# Patient Record
Sex: MALE | Race: BLACK OR AFRICAN AMERICAN | Employment: FULL TIME | URBAN - METROPOLITAN AREA
[De-identification: names, ages, dates, MRNs, and addresses within clinical notes are randomized per-mention and may not be internally consistent; named-entity substitution may affect disease eponyms.]

---

## 2017-04-14 ENCOUNTER — OFFICE VISIT (OUTPATIENT)
Dept: INTERNAL MEDICINE CLINIC | Age: 77
End: 2017-04-14

## 2017-04-14 VITALS
SYSTOLIC BLOOD PRESSURE: 130 MMHG | TEMPERATURE: 96.8 F | HEART RATE: 73 BPM | HEIGHT: 66 IN | WEIGHT: 170 LBS | OXYGEN SATURATION: 98 % | DIASTOLIC BLOOD PRESSURE: 76 MMHG | RESPIRATION RATE: 17 BRPM | BODY MASS INDEX: 27.32 KG/M2

## 2017-04-14 DIAGNOSIS — L84 CORN OF FOOT: Primary | ICD-10-CM

## 2017-04-14 NOTE — MR AVS SNAPSHOT
Visit Information Date & Time Provider Department Dept. Phone Encounter #  
 4/14/2017 11:15 AM Elian Ortega MD [facility] Internal Medicine [phone] [id number] Follow-up Instructions Return if symptoms worsen or fail to improve. Upcoming Health Maintenance Date Due  
 FOOT EXAM Q1 1/15/1950 MICROALBUMIN Q1 1/15/1950 EYE EXAM RETINAL OR DILATED Q1 1/15/1952 ZOSTER VACCINE AGE 60> 1/15/2002 GLAUCOMA SCREENING Q2Y 1/15/2007 MEDICARE YEARLY EXAM 1/15/2007 INFLUENZA AGE 9 TO ADULT 8/1/2016 HEMOGLOBIN A1C Q6M 2/18/2017 LIPID PANEL Q1 8/18/2017 DTaP/Tdap/Td series (2 - Td) 8/3/2025 COLONOSCOPY 9/20/2026 Allergies as of 4/14/2017  Review Complete On: 4/14/2017 By: Alex Anaya LPN No Known Allergies Current Immunizations  Reviewed on 8/18/2016 Name Date Influenza Vaccine Split 10/13/2010 Pneumococcal Conjugate (PCV-13) 8/18/2016 Pneumococcal Polysaccharide (PPSV-23) 8/3/2015 Tdap 8/3/2015 Not reviewed this visit You Were Diagnosed With   
  
 Codes Comments Corn of foot    -  Primary ICD-10-CM: L84 
ICD-9-CM: 428 Vitals BP Pulse Temp Resp Height(growth percentile) Weight(growth percentile) 130/76 (BP 1 Location: Left arm, BP Patient Position: Sitting) 73 96.8 °F (36 °C) (Oral) 17 5' 6\" (1.676 m) 170 lb (77.1 kg) SpO2 BMI Smoking Status 98% 27.44 kg/m2 Former Smoker Vitals History BMI and BSA Data Body Mass Index Body Surface Area  
 27.44 kg/m 2 1.89 m 2 Preferred Pharmacy Pharmacy Name Phone [address] [place] [address], [facility] Stella Darnell [phone] Your Updated Medication List  
  
   
This list is accurate as of: 4/14/17 11:34 AM.  Always use your most recent med list.  
  
  
  
  
 simvastatin 40 mg tablet Commonly known as:  ZOCOR Take 1 Tab by mouth nightly. Follow-up Instructions Return if symptoms worsen or fail to improve. Patient Instructions Corns and Calluses: Care Instructions Your Care Instructions Corns and calluses are areas of thick, hard, dead skin. They form to protect your skin from injury. Corns usually form where toes rub together. Calluses often form on the hands or feet. They may form wherever the skin rubs against something, such as shoes. In most cases, you can take steps at home to care for a corn or callus. Follow-up care is a key part of your treatment and safety. Be sure to make and go to all appointments, and call your doctor if you are having problems. It's also a good idea to know your test results and keep a list of the medicines you take. How can you care for yourself at home? · Wear shoes and other footwear that fit correctly and are roomy. This will reduce rubbing and give corns or calluses time to heal. 
· Use protective pads, such as moleskin, to cushion the callus or corn. · Soften the corn or callus and remove the dead skin by using over-the-counter products such as salicylic acid. If you have a condition that causes problems with blood flow (such as coronary artery disease) or loss of feeling in your feet (such as diabetes), talk to your doctor before you try any home treatment. · Soak your corn or callus in warm water, and then use a pumice stone to rub dead skin away. · Wash your feet regularly, and rub lotion into your feet while they are still moist. Dry skin can cause a callus to crack and bleed. · Never cut the corn or callus yourself, especially if you have problems with blood flow to your legs or feet. When should you call for help? Call your doctor now or seek immediate medical care if: 
· You have signs of infection, such as: 
¨ Increased pain, swelling, warmth, or redness around the corn or callus. ¨ Red streaks leading from the corn or callus. ¨ Pus draining from the corn or callus. ¨ A fever. Watch closely for changes in your health, and be sure to contact your doctor if: 
· You have a blood flow problem, such as diabetes, and your corn or callus is bothering you. · You do not get better as expected. Where can you learn more? Go to http://cande-damir.info/. Enter R244 in the search box to learn more about \"Corns and Calluses: Care Instructions. \" Current as of: October 13, 2016 Content Version: 11.2 © 2855-0663 [employer]Cloud. Care instructions adapted under license by [employer] (which disclaims liability or warranty for this information). If you have questions about a medical condition or this instruction, always ask your healthcare professional. [address] any warranty or liability for your use of this information. Introducing [facility] & HEALTH SERVICES! Benton Sheets introduces Pure[employer]X patient portal. Now you can access parts of your medical record, email your doctor's office, and request medication refills online. 1. In your internet browser, go to [email or web address] 2. Click on the First Time User? Click Here link in the Sign In box. You will see the New Member Sign Up page. 3. Enter your PureWRX Access Code exactly as it appears below. You will not need to use this code after youve completed the sign-up process. If you do not sign up before the expiration date, you must request a new code. · PureWRX Access Code: [email or web address] Expires: 7/13/2017 11:34 AM 
 
4. Enter the last four digits of your Social Security Number (xxxx) and Date of Birth (mm/dd/yyyy) as indicated and click Submit. You will be taken to the next sign-up page. 5. Create a PureWRX ID. This will be your PureWRX login ID and cannot be changed, so think of one that is secure and easy to remember. 6. Create a Acunotet password. You can change your password at any time. 7. Enter your Password Reset Question and Answer. This can be used at a later time if you forget your password. 8. Enter your e-mail address. You will receive e-mail notification when new information is available in [address] 9. Click Sign Up. You can now view and download portions of your medical record. 10. Click the Download Summary menu link to download a portable copy of your medical information. If you have questions, please visit the Frequently Asked Questions section of the [employer] website. Remember, [employer] is NOT to be used for urgent needs. For medical emergencies, dial 911. Now available from your iPhone and Android! Please provide this summary of care documentation to your next provider. Your primary care clinician is listed as Elian Washington. If you have any questions after today's visit, please call [phone].

## 2017-04-14 NOTE — PATIENT INSTRUCTIONS
Corns and Calluses: Care Instructions  Your Care Instructions  Corns and calluses are areas of thick, hard, dead skin. They form to protect your skin from injury. Corns usually form where toes rub together. Calluses often form on the hands or feet. They may form wherever the skin rubs against something, such as shoes. In most cases, you can take steps at home to care for a corn or callus. Follow-up care is a key part of your treatment and safety. Be sure to make and go to all appointments, and call your doctor if you are having problems. It's also a good idea to know your test results and keep a list of the medicines you take. How can you care for yourself at home? · Wear shoes and other footwear that fit correctly and are roomy. This will reduce rubbing and give corns or calluses time to heal.  · Use protective pads, such as moleskin, to cushion the callus or corn. · Soften the corn or callus and remove the dead skin by using over-the-counter products such as salicylic acid. If you have a condition that causes problems with blood flow (such as coronary artery disease) or loss of feeling in your feet (such as diabetes), talk to your doctor before you try any home treatment. · Soak your corn or callus in warm water, and then use a pumice stone to rub dead skin away. · Wash your feet regularly, and rub lotion into your feet while they are still moist. Dry skin can cause a callus to crack and bleed. · Never cut the corn or callus yourself, especially if you have problems with blood flow to your legs or feet. When should you call for help? Call your doctor now or seek immediate medical care if:  · You have signs of infection, such as:  ¨ Increased pain, swelling, warmth, or redness around the corn or callus. ¨ Red streaks leading from the corn or callus. ¨ Pus draining from the corn or callus. ¨ A fever.   Watch closely for changes in your health, and be sure to contact your doctor if:  · You have a blood flow problem, such as diabetes, and your corn or callus is bothering you. · You do not get better as expected. Where can you learn more? Go to http://cande-damir.info/. Enter R244 in the search box to learn more about \"Corns and Calluses: Care Instructions. \"  Current as of: October 13, 2016  Content Version: 11.2  © 4438-9164 [employer]. Care instructions adapted under license by [employer] (which disclaims liability or warranty for this information). If you have questions about a medical condition or this instruction, always ask your healthcare professional. [place] [address] any warranty or liability for your use of this information.

## 2017-05-01 ENCOUNTER — TELEPHONE (OUTPATIENT)
Dept: INTERNAL MEDICINE CLINIC | Age: 77
End: 2017-05-01

## 2017-05-01 NOTE — TELEPHONE ENCOUNTER
Pt was seen April 14th for pain in feet, insurance is not covering the visit, she thinks it is because we coded the appointment wrong because he was called to come in to be seen, I tried to explain to her that that is an automated call, it was probably to remind him to come in for a follow up on cholesterol but he was seen for feet and nothing was mentioned about cholesterol. Pt's wife would like Dr. Evelin Sanchez to call her back because she does not understand why she was called and he was seen for one thing and called for another.

## 2017-05-02 NOTE — TELEPHONE ENCOUNTER
I called pt's wife back, I informed her there is no way we could re-code or changed the appointment information due to he was ONLY seen for foot pain, she stated that insurance told her if he was seen for something else and had labs done for cholesterol it could be changed. I informed her I understood what insurance was saying but he was not seen for anything else but the foot pain. She stated she came back to get labs drawn for her  but we were closed. I remember her coming back, we were closed due to it being a Friday afternoon so we could not do labs considering there was no provider in the office. She is stating we called her to schedule appointment and we should have done right by them and thought to draw labs for his cholesterol. I informed her that we would not have done that due to the fact that he only came in to be seen for foot pain and not a follow up. She does not want to listen to what I am saying and wants to only speak to a manager about this bill. I also informed her again that the call is an automated call and it is to remind patients to schedule follows up, it does not mean they have to come in, it is just a friendly reminder, but when they scheduled appointment he was seen for foot pain and foot pain only.

## 2017-07-06 RX ORDER — SIMVASTATIN 40 MG/1
TABLET, FILM COATED ORAL
Qty: 90 TAB | Refills: 1 | Status: SHIPPED | OUTPATIENT
Start: 2017-07-06 | End: 2018-05-09 | Stop reason: SDUPTHER

## 2017-10-11 ENCOUNTER — OFFICE VISIT (OUTPATIENT)
Dept: INTERNAL MEDICINE CLINIC | Age: 77
End: 2017-10-11

## 2017-10-11 VITALS
WEIGHT: 178.6 LBS | RESPIRATION RATE: 17 BRPM | DIASTOLIC BLOOD PRESSURE: 77 MMHG | SYSTOLIC BLOOD PRESSURE: 137 MMHG | HEART RATE: 73 BPM | BODY MASS INDEX: 28.7 KG/M2 | TEMPERATURE: 97.7 F | OXYGEN SATURATION: 98 % | HEIGHT: 66 IN

## 2017-10-11 DIAGNOSIS — Z00.00 WELL ADULT ON ROUTINE HEALTH CHECK: Primary | ICD-10-CM

## 2017-10-11 DIAGNOSIS — Z23 ENCOUNTER FOR IMMUNIZATION: ICD-10-CM

## 2017-10-11 DIAGNOSIS — S05.92XA LEFT EYE INJURY, INITIAL ENCOUNTER: ICD-10-CM

## 2017-10-11 DIAGNOSIS — Z78.9 FULL CODE STATUS: ICD-10-CM

## 2017-10-11 RX ORDER — ERYTHROMYCIN 5 MG/G
OINTMENT OPHTHALMIC
Qty: 1 G | Refills: 0 | Status: SHIPPED | OUTPATIENT
Start: 2017-10-11 | End: 2022-06-27

## 2017-10-11 NOTE — MR AVS SNAPSHOT
Visit Information Date & Time Provider Department Dept. Phone Encounter #  
 10/11/2017  2:30 PM Kay Keller MD [facility] Internal Medicine [phone] [id number] Follow-up Instructions Return in about 1 year (around 10/11/2018), or if symptoms worsen or fail to improve. Upcoming Health Maintenance Date Due  
 FOOT EXAM Q1 1/15/1950 MICROALBUMIN Q1 1/15/1950 EYE EXAM RETINAL OR DILATED Q1 1/15/1952 ZOSTER VACCINE AGE 60> 11/15/2001 GLAUCOMA SCREENING Q2Y 1/15/2007 MEDICARE YEARLY EXAM 1/15/2007 HEMOGLOBIN A1C Q6M 2/18/2017 INFLUENZA AGE 9 TO ADULT 8/1/2017 LIPID PANEL Q1 8/18/2017 DTaP/Tdap/Td series (2 - Td) 8/3/2025 COLONOSCOPY 9/20/2026 Allergies as of 10/11/2017  Review Complete On: 4/14/2017 By: Wili Garcia LPN No Known Allergies Current Immunizations  Reviewed on 10/11/2017 Name Date Influenza High Dose Vaccine PF  Incomplete Influenza Vaccine Split 10/13/2010 Pneumococcal Conjugate (PCV-13) 8/18/2016 Pneumococcal Polysaccharide (PPSV-23) 8/3/2015 Tdap 8/3/2015 Reviewed by Kay Keller MD on 10/11/2017 at  2:50 PM  
You Were Diagnosed With   
  
 Codes Comments Well adult on routine health check    -  Primary ICD-10-CM: Z00.00 ICD-9-CM: V70.0 Encounter for immunization     ICD-10-CM: P93 ICD-9-CM: V03.89 Left eye injury, initial encounter     ICD-10-CM: S05. 92XA ICD-9-CM: 921.9 Full code status     ICD-10-CM: Z78.9 ICD-9-CM: V49.89 Vitals BP Pulse Temp Resp Height(growth percentile) Weight(growth percentile)  
 137/77 (BP 1 Location: Left arm, BP Patient Position: Sitting) 73 97.7 °F (36.5 °C) (Oral) 17 5' 6\" (1.676 m) 178 lb 9.6 oz (81 kg) SpO2 BMI Smoking Status 98% 28.83 kg/m2 Former Smoker Vitals History BMI and BSA Data Body Mass Index Body Surface Area  
 28.83 kg/m 2 1.94 m 2 Preferred Pharmacy Pharmacy Name Phone [id number] [phone] Your Updated Medication List  
  
   
This list is accurate as of: 10/11/17  3:09 PM.  Always use your most recent med list.  
  
  
  
  
 erythromycin ophthalmic ointment Commonly known as:  ILOTYCIN  
I inch ribbon in left eye 4-6 times/day. simvastatin 40 mg tablet Commonly known as:  ZOCOR  
TAKE 1 TABLET NIGHTLY Prescriptions Sent to Pharmacy Refills  
 erythromycin (ILOTYCIN) ophthalmic ointment 0 Sig: I inch ribbon in left eye 4-6 times/day. Class: Normal  
 Pharmacy: [facility]/pharmacy #[id number] Ph #: [phone] We Performed the Following CBC WITH AUTOMATED DIFF [43496 CPT(R)] FULL CODE [COD2 Custom] HEMOGLOBIN A1C WITH EAG [05599 CPT(R)] INFLUENZA VIRUS VACCINE, HIGH DOSE SEASONAL, PRESERVATIVE FREE [59969 CPT(R)] LIPID PANEL [18426 CPT(R)] METABOLIC PANEL, COMPREHENSIVE [54547 CPT(R)] PSA W/ REFLX FREE PSA [67677 CPT(R)] TSH AND FREE T4 [78884 CPT(R)] Follow-up Instructions Return in about 1 year (around 10/11/2018), or if symptoms worsen or fail to improve. Introducing [facility] & HEALTH SERVICES! Mera Wright introduces n1health patient portal. Now you can access parts of your medical record, email your doctor's office, and request medication refills online. 1. In your internet browser, go to [email or web address] [email or web address] 2. Click on the First Time User? Click Here link in the Sign In box. You will see the New Member Sign Up page. 3. Enter your n1health Access Code exactly as it appears below. You will not need to use this code after youve completed the sign-up process. If you do not sign up before the expiration date, you must request a new code. · n1health Access Code: [email or web address] Expires: 1/9/2018  2:39 PM 
 
 4. Enter the last four digits of your Social Security Number (xxxx) and Date of Birth (mm/dd/yyyy) as indicated and click Submit. You will be taken to the next sign-up page. 5. Create a Crumpet Cashmere ID. This will be your [employer] login ID and cannot be changed, so think of one that is secure and easy to remember. 6. Create a [employer] password. You can change your password at any time. 7. Enter your Password Reset Question and Answer. This can be used at a later time if you forget your password. 8. Enter your e-mail address. You will receive e-mail notification when new information is available in [address] 9. Click Sign Up. You can now view and download portions of your medical record. 10. Click the Download Summary menu link to download a portable copy of your medical information. If you have questions, please visit the Frequently Asked Questions section of the [employer] website. Remember, [employer] is NOT to be used for urgent needs. For medical emergencies, dial 911. Now available from your iPhone and Android! Please provide this summary of care documentation to your next provider. Your primary care clinician is listed as Elian Washington. If you have any questions after today's visit, please call [phone].

## 2017-10-11 NOTE — LETTER
10/13/2017 10:06 AM 
 
[facility] [address] [facility] [address] Dear Frederic Mccartney: 
 
Please find your most recent results below. Resulted Orders CBC WITH AUTOMATED DIFF Result Value Ref Range WBC 6.8 3.4 - 10.8 x10E3/uL  
 RBC 4.27 4. 14 - 5.80 x10E6/uL HGB 13.2 12.6 - 17.7 g/dL HCT 38.5 37.5 - 51.0 % MCV 90 79 - 97 fL  
 MCH 30.9 26.6 - 33.0 pg  
 MCHC 34.3 31.5 - 35.7 g/dL  
 RDW 13.9 12.3 - 15.4 % PLATELET 937 256 - 814 x10E3/uL NEUTROPHILS 59 Not Estab. % Lymphocytes 29 Not Estab. % MONOCYTES 10 Not Estab. % EOSINOPHILS 2 Not Estab. % BASOPHILS 0 Not Estab. %  
 ABS. NEUTROPHILS 4.0 1.4 - 7.0 x10E3/uL Abs Lymphocytes 2.0 0.7 - 3.1 x10E3/uL  
 ABS. MONOCYTES 0.7 0.1 - 0.9 x10E3/uL  
 ABS. EOSINOPHILS 0.1 0.0 - 0.4 x10E3/uL  
 ABS. BASOPHILS 0.0 0.0 - 0.2 x10E3/uL IMMATURE GRANULOCYTES 0 Not Estab. %  
 ABS. IMM. GRANS. 0.0 0.0 - 0.1 x10E3/uL Narrative Performed at:  [address]  [id number] : Veronica Piper MD, Phone:  [phone] METABOLIC PANEL, COMPREHENSIVE Result Value Ref Range Glucose 118 (H) 65 - 99 mg/dL BUN 10 8 - 27 mg/dL Creatinine 1.00 0.76 - 1.27 mg/dL GFR est non-AA 73 >59 mL/min/1.73 GFR est AA 85 >59 mL/min/1.73  
 BUN/Creatinine ratio 10 10 - 24 Sodium 140 134 - 144 mmol/L Potassium 4.1 3.5 - 5.2 mmol/L Chloride 102 96 - 106 mmol/L  
 CO2 21 18 - 29 mmol/L Calcium 9.1 8.6 - 10.2 mg/dL Protein, total 6.4 6.0 - 8.5 g/dL Albumin 4.1 3.5 - 4.8 g/dL GLOBULIN, TOTAL 2.3 1.5 - 4.5 g/dL A-G Ratio 1.8 1.2 - 2.2 Bilirubin, total 0.3 0.0 - 1.2 mg/dL Alk. phosphatase 76 39 - 117 IU/L  
 AST (SGOT) 22 0 - 40 IU/L  
 ALT (SGPT) 16 0 - 44 IU/L Narrative Performed at:  [address]  [id number] : Veronica Piper MD, Phone:  [phone] LIPID PANEL  
 Result Value Ref Range Cholesterol, total 141 100 - 199 mg/dL Triglyceride 117 0 - 149 mg/dL HDL Cholesterol 36 (L) >39 mg/dL VLDL, calculated 23 5 - 40 mg/dL LDL, calculated 82 0 - 99 mg/dL Narrative Performed at:  [address]  [id number] : Lan Armstrong MD, Phone:  [phone] HEMOGLOBIN A1C WITH EAG Result Value Ref Range Hemoglobin A1c 6.1 (H) 4.8 - 5.6 % Comment:  
            Pre-diabetes: 5.7 - 6.4 Diabetes: >6.4 Glycemic control for adults with diabetes: <7.0 Estimated average glucose 128 mg/dL Narrative Performed at:  [address]  [id number] : Lan Armstrong MD, Phone:  [phone] TSH AND FREE T4 Result Value Ref Range TSH 1.870 0.450 - 4.500 uIU/mL T4, Free 0.94 0.82 - 1.77 ng/dL Narrative Performed at:  [address]  [id number] : Lan Armstrong MD, Phone:  [phone] PSA W/ REFLX FREE PSA Result Value Ref Range Prostate Specific Ag <0.1 0.0 - 4.0 ng/mL Comment:  
   Roche ECLIA methodology. According to the American Urological Association, Serum PSA should 
decrease and remain at undetectable levels after radical 
prostatectomy. The AUA defines biochemical recurrence as an initial 
PSA value 0.2 ng/mL or greater followed by a subsequent confirmatory PSA value 0.2 ng/mL or greater. Values obtained with different assay methods or kits cannot be used 
interchangeably. Results cannot be interpreted as absolute evidence 
of the presence or absence of malignant disease. Reflex Criteria Comment Comment:  
   The percent free PSA is performed on a reflex basis only when the 
total PSA is between 4.0 and 10.0 ng/mL. Narrative Performed at:  [address]  [id number] : Latha Ross MD, Phone:  [phone] CVD REPORT Result Value Ref Range INTERPRETATION Note Comment:  
   Supplement report is available. Narrative Performed at:  [address] [place] [address]  [id number] : Jennifer Corado PhD, Phone:  [phone] RECOMMENDATIONS: 
 
Thyroid, CBC, kidney, liver, prostate enzyme level is normal.  
 
Average blood sugar level is better. Keep up the good work! Please call me if you have any questions: [phone] Sincerely, 
 
 
Rahat Gallo MD

## 2017-10-11 NOTE — PROGRESS NOTES
After obtaining consent, and per orders of Dr. Jazlyn Pham, injection of influenza given by Tad Carrera LPN. Patient instructed to remain in clinic for 20 minutes afterwards, and to report any adverse reaction to me immediately. Patient did not have any adverse reactions during this office visit.       Tad Carrera LPN

## 2017-10-12 ENCOUNTER — CLINICAL SUPPORT (OUTPATIENT)
Dept: INTERNAL MEDICINE CLINIC | Age: 77
End: 2017-10-12

## 2017-10-12 DIAGNOSIS — Z00.00 WELL ADULT EXAM: Primary | ICD-10-CM

## 2017-10-12 NOTE — PROGRESS NOTES
Subjective:   Adi Thomson is a 78 y.o. male presenting today with his wife  for his annual checkup. Reports that two weeks ago something fell on his left eye . He rubbed his eye to clear it . He started having pain and redness in that eye, but recently the pain is gone but the redness is still there. He denies any blurriness. ROS:  Feeling well. No dyspnea or chest pain on exertion. No abdominal pain, change in bowel habits, black or bloody stools. No urinary tract or prostatic symptoms. No neurological complaints. Specific concerns today: see HPI. [place] Patient Active Problem List   Diagnosis Code    Sebaceous cyst L72.3    Prostate cancer ([facility] [address] C61    Hyperlipidemia LDL goal <100 E78.5    Prediabetes R73.03     Current Outpatient Prescriptions   Medication Sig Dispense Refill    erythromycin (ILOTYCIN) ophthalmic ointment I inch ribbon in left eye 4-6 times/day. 1 g 0    simvastatin (ZOCOR) 40 mg tablet TAKE 1 TABLET NIGHTLY 90 Tab 1     No Known Allergies  Past Medical History:   Diagnosis Date    Cancer [facility]) 2007    prostate    ED (erectile dysfunction)     Hypercholesterolemia     Sebaceous cyst 2/11/2013     Past Surgical History:   Procedure Laterality Date    COLONOSCOPY N/A 9/20/2016    COLONOSCOPY performed by Jose Otero MD at [address] [facility], DIAGNOSTIC  2006    HX ORTHOPAEDIC  12/2015    removed cyst on back    HX PROSTATECTOMY  2007     No family history on file. Social History   Substance Use Topics    Smoking status: Former Smoker    Smokeless tobacco: Never Used    Alcohol use No        Lab Results  Component Value Date/Time   Cholesterol, total 143 08/18/2016 02:44 PM   HDL Cholesterol 41 08/18/2016 02:44 PM   LDL, calculated 82 08/18/2016 02:44 PM   Triglyceride 98 08/18/2016 02:44 PM     Lab Results  Component Value Date/Time   ALT (SGPT) 23 08/18/2016 02:44 PM   AST (SGOT) 24 08/18/2016 02:44 PM   Alk.  phosphatase 76 08/18/2016 02:44 PM Bilirubin, total 0.5 08/18/2016 02:44 PM   Albumin 4.5 08/18/2016 02:44 PM   Protein, total 6.7 08/18/2016 02:44 PM   PLATELET 501 58/67/8021 02:44 PM       Lab Results  Component Value Date/Time   GFR est non-AA 88 08/18/2016 02:44 PM   GFR est  08/18/2016 02:44 PM   Creatinine 0.81 08/18/2016 02:44 PM   BUN 9 08/18/2016 02:44 PM   Sodium 144 08/18/2016 02:44 PM   Potassium 4.2 08/18/2016 02:44 PM   Chloride 104 08/18/2016 02:44 PM   CO2 20 08/18/2016 02:44 PM   Lab Results  Component Value Date/Time   Prostate Specific Ag <0.1 08/18/2016 02:44 PM   Prostate Specific Ag <0.1 08/05/2015 07:36 AM   Prostate Specific Ag <0.1 01/31/2013 03:36 PM     Lab Results   Component Value Date/Time    Hemoglobin A1c 6.3 08/18/2016 02:44 PM                       Objective:   Visit Vitals    /77 (BP 1 Location: Left arm, BP Patient Position: Sitting)    Pulse 73    Temp 97.7 °F (36.5 °C) (Oral)    Resp 17    Ht 5' 6\" (1.676 m)    Wt 178 lb 9.6 oz (81 kg)    SpO2 98%    BMI 28.83 kg/m2     The patient appears well, alert, oriented x 3, in no distress. Left corner of conjunctiva is erythematous. ROM is normal. Non tender. ENT normal.  Neck supple. No adenopathy or thyromegaly. ALTAGRACIA. Lungs are clear, good air entry, no wheezes, rhonchi or rales. S1 and S2 normal, no murmurs, regular rate and rhythm. Abdomen is soft without tenderness, guarding, mass or organomegaly.  exam: deferred. Extremities show no edema, normal peripheral pulses. Neurological is normal without focal findings. Assessment/Plan:   healthy adult male  follow low fat diet, follow low salt diet, continue present plan, routine labs ordered. ICD-10-CM ICD-9-CM    1. Well adult on routine health check Z00.00 V70.0 CBC WITH AUTOMATED DIFF      METABOLIC PANEL, COMPREHENSIVE      LIPID PANEL      HEMOGLOBIN A1C WITH EAG      TSH AND FREE T4      PSA W/ REFLX FREE PSA   2.  Encounter for immunization Z23 V03.89 INFLUENZA VIRUS VACCINE, HIGH DOSE SEASONAL, PRESERVATIVE FREE   3. Left eye injury, initial encounter S05. 92XA 921.9 erythromycin (ILOTYCIN) ophthalmic ointment   4. Full code status Z78.9 V49.89 FULL CODE     Diagnoses and all orders for this visit:    1. Well adult on routine health check  -     CBC WITH AUTOMATED DIFF  -     METABOLIC PANEL, COMPREHENSIVE  -     LIPID PANEL  -     HEMOGLOBIN A1C WITH EAG  -     TSH AND FREE T4  -     PSA W/ REFLX FREE PSA    2. Left eye injury, initial encounter  -     Start erythromycin (ILOTYCIN) ophthalmic ointment; I inch ribbon in left eye 4-6 times/day. -      Follow up with eye specialist if symptoms not better. 3. Encounter for immunization  -     INFLUENZA VIRUS VACCINE, HIGH DOSE SEASONAL, PRESERVATIVE FREE    4. Full code status  -     FULL CODE      Follow-up Disposition:  Return in about 1 year (around 10/11/2018), or if symptoms worsen or fail to improve. reviewed diet, exercise and weight control.

## 2017-10-12 NOTE — PROGRESS NOTES
Chief Complaint   Patient presents with    Labs Only     Patient here for labs only. Blood draw tolerated well in left antecubital. Informed pt that he will be notified of results. Pt verbalized his understanding.

## 2017-10-13 LAB
ALBUMIN SERPL-MCNC: 4.1 G/DL (ref 3.5–4.8)
ALBUMIN/GLOB SERPL: 1.8 {RATIO} (ref 1.2–2.2)
ALP SERPL-CCNC: 76 IU/L (ref 39–117)
ALT SERPL-CCNC: 16 IU/L (ref 0–44)
AST SERPL-CCNC: 22 IU/L (ref 0–40)
BASOPHILS # BLD AUTO: 0 X10E3/UL (ref 0–0.2)
BASOPHILS NFR BLD AUTO: 0 %
BILIRUB SERPL-MCNC: 0.3 MG/DL (ref 0–1.2)
BUN SERPL-MCNC: 10 MG/DL (ref 8–27)
BUN/CREAT SERPL: 10 (ref 10–24)
CALCIUM SERPL-MCNC: 9.1 MG/DL (ref 8.6–10.2)
CHLORIDE SERPL-SCNC: 102 MMOL/L (ref 96–106)
CHOLEST SERPL-MCNC: 141 MG/DL (ref 100–199)
CO2 SERPL-SCNC: 21 MMOL/L (ref 18–29)
CREAT SERPL-MCNC: 1 MG/DL (ref 0.76–1.27)
EOSINOPHIL # BLD AUTO: 0.1 X10E3/UL (ref 0–0.4)
EOSINOPHIL NFR BLD AUTO: 2 %
ERYTHROCYTE [DISTWIDTH] IN BLOOD BY AUTOMATED COUNT: 13.9 % (ref 12.3–15.4)
EST. AVERAGE GLUCOSE BLD GHB EST-MCNC: 128 MG/DL
GLOBULIN SER CALC-MCNC: 2.3 G/DL (ref 1.5–4.5)
GLUCOSE SERPL-MCNC: 118 MG/DL (ref 65–99)
HBA1C MFR BLD: 6.1 % (ref 4.8–5.6)
HCT VFR BLD AUTO: 38.5 % (ref 37.5–51)
HDLC SERPL-MCNC: 36 MG/DL
HGB BLD-MCNC: 13.2 G/DL (ref 12.6–17.7)
IMM GRANULOCYTES # BLD: 0 X10E3/UL (ref 0–0.1)
IMM GRANULOCYTES NFR BLD: 0 %
INTERPRETATION, 910389: NORMAL
LDLC SERPL CALC-MCNC: 82 MG/DL (ref 0–99)
LYMPHOCYTES # BLD AUTO: 2 X10E3/UL (ref 0.7–3.1)
LYMPHOCYTES NFR BLD AUTO: 29 %
MCH RBC QN AUTO: 30.9 PG (ref 26.6–33)
MCHC RBC AUTO-ENTMCNC: 34.3 G/DL (ref 31.5–35.7)
MCV RBC AUTO: 90 FL (ref 79–97)
MONOCYTES # BLD AUTO: 0.7 X10E3/UL (ref 0.1–0.9)
MONOCYTES NFR BLD AUTO: 10 %
NEUTROPHILS # BLD AUTO: 4 X10E3/UL (ref 1.4–7)
NEUTROPHILS NFR BLD AUTO: 59 %
PLATELET # BLD AUTO: 226 X10E3/UL (ref 150–379)
POTASSIUM SERPL-SCNC: 4.1 MMOL/L (ref 3.5–5.2)
PROT SERPL-MCNC: 6.4 G/DL (ref 6–8.5)
PSA SERPL-MCNC: <0.1 NG/ML (ref 0–4)
RBC # BLD AUTO: 4.27 X10E6/UL (ref 4.14–5.8)
REFLEX CRITERIA: NORMAL
SODIUM SERPL-SCNC: 140 MMOL/L (ref 134–144)
T4 FREE SERPL-MCNC: 0.94 NG/DL (ref 0.82–1.77)
TRIGL SERPL-MCNC: 117 MG/DL (ref 0–149)
TSH SERPL DL<=0.005 MIU/L-ACNC: 1.87 UIU/ML (ref 0.45–4.5)
VLDLC SERPL CALC-MCNC: 23 MG/DL (ref 5–40)
WBC # BLD AUTO: 6.8 X10E3/UL (ref 3.4–10.8)

## 2017-10-13 NOTE — PROGRESS NOTES
Please call patient to let him know that:    Thyroid, CBC, kidney, liver, prostate enzyme level is normal.     Average blood sugar level is better. Keep up the good work!

## 2018-05-09 RX ORDER — SIMVASTATIN 40 MG/1
TABLET, FILM COATED ORAL
Qty: 90 TAB | Refills: 1 | Status: SHIPPED | OUTPATIENT
Start: 2018-05-09 | End: 2022-03-01

## 2020-11-16 ENCOUNTER — OFFICE VISIT (OUTPATIENT)
Dept: PRIMARY CARE CLINIC | Age: 80
End: 2020-11-16
Payer: COMMERCIAL

## 2020-11-16 VITALS
SYSTOLIC BLOOD PRESSURE: 140 MMHG | WEIGHT: 176.6 LBS | TEMPERATURE: 97.8 F | DIASTOLIC BLOOD PRESSURE: 80 MMHG | OXYGEN SATURATION: 99 % | BODY MASS INDEX: 28.38 KG/M2 | RESPIRATION RATE: 18 BRPM | HEART RATE: 76 BPM | HEIGHT: 66 IN

## 2020-11-16 DIAGNOSIS — Z00.00 WELL ADULT ON ROUTINE HEALTH CHECK: ICD-10-CM

## 2020-11-16 DIAGNOSIS — E78.5 HYPERLIPIDEMIA LDL GOAL <100: ICD-10-CM

## 2020-11-16 DIAGNOSIS — C61 PROSTATE CANCER (HCC): ICD-10-CM

## 2020-11-16 DIAGNOSIS — R06.02 SHORTNESS OF BREATH: ICD-10-CM

## 2020-11-16 DIAGNOSIS — R73.03 PREDIABETES: ICD-10-CM

## 2020-11-16 DIAGNOSIS — R03.0 ELEVATED BLOOD PRESSURE READING: ICD-10-CM

## 2020-11-16 DIAGNOSIS — Z00.00 WELL ADULT ON ROUTINE HEALTH CHECK: Primary | ICD-10-CM

## 2020-11-16 PROCEDURE — 99213 OFFICE O/P EST LOW 20 MIN: CPT | Performed by: FAMILY MEDICINE

## 2020-11-16 PROCEDURE — 93000 ELECTROCARDIOGRAM COMPLETE: CPT | Performed by: FAMILY MEDICINE

## 2020-11-16 PROCEDURE — 99397 PER PM REEVAL EST PAT 65+ YR: CPT | Performed by: FAMILY MEDICINE

## 2020-11-16 RX ORDER — DIPHENHYDRAMINE HCL 25 MG
25 TABLET ORAL
COMMUNITY

## 2020-11-16 NOTE — PROGRESS NOTES
Chief Complaint   Patient presents with   Luke Loomis Annual Wellness Visit     3 most recent PHQ Screens 11/16/2020   Little interest or pleasure in doing things Not at all   Feeling down, depressed, irritable, or hopeless Not at all   Total Score PHQ 2 0     Abuse Screening Questionnaire 11/16/2020   Do you ever feel afraid of your partner? N   Are you in a relationship with someone who physically or mentally threatens you? N   Is it safe for you to go home? Y     Visit Vitals  BP (!) 161/71 (BP 1 Location: Left arm, BP Patient Position: Sitting)   Pulse 76   Temp 97.8 °F (36.6 °C) (Oral)   Resp 18   Ht 5' 6\" (1.676 m)   Wt 176 lb 9.6 oz (80.1 kg)   SpO2 99%   BMI 28.50 kg/m²     1. Have you been to the ER, urgent care clinic since your last visit? Hospitalized since your last visit?no    2. Have you seen or consulted any other health care providers outside of the [address] [place] [address] since your last visit? Include any pap smears or colon screening.  no

## 2020-11-16 NOTE — PROGRESS NOTES
Subjective:   Radha Pineda is a 68 y.o. male presenting for his annual checkup. He is here after three years. He works full time. Patient denies any chest pain but sometimes she feels soa. Intermittently. Sometimes happens when he is just sitting. No wheezing, cough, leg swelling, orthopnea. Hx of prostatectomy due to prostate cancer. Stopped taking Zocor for the past two years. ROS:  Refer to HPI. Feeling well. No dyspnea or chest pain on exertion. No abdominal pain, change in bowel habits, black or bloody stools. No urinary tract or prostatic symptoms. No neurological complaints. Specific concerns today: refer to HPI. Patient Active Problem List   Diagnosis Code    Sebaceous cyst L72.3    Prostate cancer ([facility] [address] C61    Hyperlipidemia LDL goal <100 E78.5    Prediabetes R73.03     Current Outpatient Medications   Medication Sig Dispense Refill    diphenhydrAMINE (Allergy) 25 mg tablet Take 25 mg by mouth every six (6) hours as needed.  simvastatin (ZOCOR) 40 mg tablet TAKE 1 TABLET BY MOUTH NIGHTLY 90 Tab 1    erythromycin (ILOTYCIN) ophthalmic ointment I inch ribbon in left eye 4-6 times/day. 1 g 0     No Known Allergies  Past Medical History:   Diagnosis Date    Cancer [facility]) 2007    prostate    ED (erectile dysfunction)     Hypercholesterolemia     Sebaceous cyst 2/11/2013     Past Surgical History:   Procedure Laterality Date    COLONOSCOPY N/A 9/20/2016    COLONOSCOPY performed by Katya Maldonado MD at [facility]. [address], COLON, DIAGNOSTIC  2006    HX ORTHOPAEDIC  12/2015    removed cyst on back    HX PROSTATECTOMY  2007     No family history on file. Social History     Tobacco Use    Smoking status: Former Smoker    Smokeless tobacco: Never Used   Substance Use Topics    Alcohol use: No        Lab Results   Component Value Date/Time    ALT (SGPT) 16 10/12/2017 09:51 AM    Alk.  phosphatase 76 10/12/2017 09:51 AM    Bilirubin, total 0.3 10/12/2017 09:51 AM Albumin 4.1 10/12/2017 09:51 AM    Protein, total 6.4 10/12/2017 09:51 AM    PLATELET 257 19/38/9046 09:51 AM     Lab Results   Component Value Date/Time    GFR est non-AA 73 10/12/2017 09:51 AM    GFR est AA 85 10/12/2017 09:51 AM    Creatinine 1.00 10/12/2017 09:51 AM    BUN 10 10/12/2017 09:51 AM    Sodium 140 10/12/2017 09:51 AM    Potassium 4.1 10/12/2017 09:51 AM    Chloride 102 10/12/2017 09:51 AM    CO2 21 10/12/2017 09:51 AM     Lab Results   Component Value Date/Time    Prostate Specific Ag <0.1 10/12/2017 09:51 AM    Prostate Specific Ag <0.1 08/18/2016 02:44 PM    Prostate Specific Ag <0.1 08/05/2015 07:36 AM     Lab Results   Component Value Date/Time    Hemoglobin A1c 6.1 (H) 10/12/2017 09:51 AM         Objective:     Visit Vitals  BP (!) 140/80 (BP 1 Location: Left arm, BP Patient Position: Sitting)   Pulse 76   Temp 97.8 °F (36.6 °C) (Oral)   Resp 18   Ht 5' 6\" (1.676 m)   Wt 176 lb 9.6 oz (80.1 kg)   SpO2 99%   BMI 28.50 kg/m²     The patient appears well, alert, oriented x 3, in no distress. ENT normal.  Neck supple. No adenopathy or thyromegaly. ALTAGRACIA. Lungs are clear, good air entry, no wheezes, rhonchi or rales. S1 and S2 normal, no murmurs, regular rate and rhythm. Abdomen is soft without tenderness, guarding, mass or organomegaly.  exam: deferred. Extremities show no edema, normal peripheral pulses. Neurological is normal without focal findings. MSK: arthritic change noted in his finger joints. Assessment/Plan:   healthy adult male  lose weight, increase physical activity, follow low fat diet, follow low salt diet, continue present plan, routine labs ordered. ICD-10-CM ICD-9-CM    1. Well adult on routine health check  L17.52 B59.6 METABOLIC PANEL, COMPREHENSIVE      CBC WITH AUTOMATED DIFF      THYROID CASCADE PROFILE   2. Prostate cancer (HCC)  C61 185 PSA W/ REFLX FREE PSA   3. Prediabetes  R73.03 790.29 HEMOGLOBIN A1C WITH EAG   4.  Hyperlipidemia LDL goal <100  E78.5 272.4 LIPID PANEL      METABOLIC PANEL, COMPREHENSIVE   5. Shortness of breath  R06.02 786.05 AMB POC EKG ROUTINE W/ 12 LEADS, INTER & REP     Diagnoses and all orders for this visit:    1. Well adult on routine health check  -     METABOLIC PANEL, COMPREHENSIVE; Future  -     CBC WITH AUTOMATED DIFF; Future  -     THYROID CASCADE PROFILE; Future    2. Shortness of breath  -     AMB POC EKG ROUTINE W/ 12 LEADS, INTER & REP-  Normal.             Mild sob notice intermittently. Advised to follow up with me if symptoms worsen or he continue to have symptoms. 3. Prostate cancer (HCC)  -     PSA W/ REFLX FREE PSA; Future    4. Prediabetes  -     HEMOGLOBIN A1C WITH EAG; Future              Counseled on diet and exercise. 5. Hyperlipidemia LDL goal <100  -     LIPID PANEL; Future  -     METABOLIC PANEL, COMPREHENSIVE; Future        He is not taking zocor for the past two years. 6. Elevated blood pressure: Advised to check BP daily and follow up. F/u sooner of BP stays >140/90s    Follow-up and Dispositions    · Return in about 6 months (around 5/16/2021), or if symptoms worsen or fail to improve, for Bring BP log book. .       current treatment plan is effective, no change in therapy  reviewed diet, exercise and weight control.

## 2020-11-19 LAB
ALBUMIN SERPL-MCNC: 4.2 G/DL (ref 3.7–4.7)
ALBUMIN/GLOB SERPL: 1.6 {RATIO} (ref 1.2–2.2)
ALP SERPL-CCNC: 93 IU/L (ref 39–117)
ALT SERPL-CCNC: 11 IU/L (ref 0–44)
AST SERPL-CCNC: 19 IU/L (ref 0–40)
BASOPHILS # BLD AUTO: 0 X10E3/UL (ref 0–0.2)
BASOPHILS NFR BLD AUTO: 1 %
BILIRUB SERPL-MCNC: 0.3 MG/DL (ref 0–1.2)
BUN SERPL-MCNC: 9 MG/DL (ref 8–27)
BUN/CREAT SERPL: 8 (ref 10–24)
CALCIUM SERPL-MCNC: 9.1 MG/DL (ref 8.6–10.2)
CHLORIDE SERPL-SCNC: 104 MMOL/L (ref 96–106)
CHOLEST SERPL-MCNC: 220 MG/DL (ref 100–199)
CO2 SERPL-SCNC: 24 MMOL/L (ref 20–29)
CREAT SERPL-MCNC: 1.07 MG/DL (ref 0.76–1.27)
EOSINOPHIL # BLD AUTO: 0.1 X10E3/UL (ref 0–0.4)
EOSINOPHIL NFR BLD AUTO: 1 %
ERYTHROCYTE [DISTWIDTH] IN BLOOD BY AUTOMATED COUNT: 13.5 % (ref 11.6–15.4)
EST. AVERAGE GLUCOSE BLD GHB EST-MCNC: 134 MG/DL
GLOBULIN SER CALC-MCNC: 2.6 G/DL (ref 1.5–4.5)
GLUCOSE SERPL-MCNC: 87 MG/DL (ref 65–99)
HBA1C MFR BLD: 6.3 % (ref 4.8–5.6)
HCT VFR BLD AUTO: 41.9 % (ref 37.5–51)
HDLC SERPL-MCNC: 35 MG/DL
HGB BLD-MCNC: 14.2 G/DL (ref 13–17.7)
IMM GRANULOCYTES # BLD AUTO: 0 X10E3/UL (ref 0–0.1)
IMM GRANULOCYTES NFR BLD AUTO: 0 %
LDLC SERPL CALC-MCNC: 165 MG/DL (ref 0–99)
LYMPHOCYTES # BLD AUTO: 2.1 X10E3/UL (ref 0.7–3.1)
LYMPHOCYTES NFR BLD AUTO: 38 %
MCH RBC QN AUTO: 29.8 PG (ref 26.6–33)
MCHC RBC AUTO-ENTMCNC: 33.9 G/DL (ref 31.5–35.7)
MCV RBC AUTO: 88 FL (ref 79–97)
MONOCYTES # BLD AUTO: 0.5 X10E3/UL (ref 0.1–0.9)
MONOCYTES NFR BLD AUTO: 9 %
NEUTROPHILS # BLD AUTO: 2.9 X10E3/UL (ref 1.4–7)
NEUTROPHILS NFR BLD AUTO: 51 %
PLATELET # BLD AUTO: 250 X10E3/UL (ref 150–450)
POTASSIUM SERPL-SCNC: 4.2 MMOL/L (ref 3.5–5.2)
PROT SERPL-MCNC: 6.8 G/DL (ref 6–8.5)
PSA SERPL-MCNC: 0.1 NG/ML (ref 0–4)
RBC # BLD AUTO: 4.77 X10E6/UL (ref 4.14–5.8)
REFLEX CRITERIA: NORMAL
SODIUM SERPL-SCNC: 141 MMOL/L (ref 134–144)
TRIGL SERPL-MCNC: 108 MG/DL (ref 0–149)
TSH SERPL DL<=0.005 MIU/L-ACNC: 1.29 UIU/ML (ref 0.45–4.5)
VLDLC SERPL CALC-MCNC: 20 MG/DL (ref 5–40)
WBC # BLD AUTO: 5.6 X10E3/UL (ref 3.4–10.8)

## 2020-11-19 NOTE — PROGRESS NOTES
Please mail letter:    1. CBC, kidney, liver, thyroid  level is within normal range. 2. Total cholesterol level and the bad cholesterol level is moderately  elevated. Continue watching your diet, eat healthy, less ambriz and fatty food, more baked or grilled or broiled food. Exercise 150 minutes/week will be helpful as well. Will recheck level in 6 months. Please make appointment in 6 months. 3. Prediabetes: Average blood sugar( Hg A1c ) is in the range of prediabetic level. Prediabetes is a warning sign that you are at risk for getting type 2 diabetes. It means that your blood sugar is higher than it should be. Most people who get type 2 diabetes have prediabetes first. The good news is that lifestyle changes may help you get your blood sugar back to normal and avoid or delay diabetes. Will recheck this level in 6 months. 4. PSA level is 0.1, it should be <0.1 with your history of prostatectomy. Need a follow up PSA in 6 months.

## 2021-03-11 ENCOUNTER — OFFICE VISIT (OUTPATIENT)
Dept: PRIMARY CARE CLINIC | Age: 81
End: 2021-03-11
Payer: COMMERCIAL

## 2021-03-11 VITALS
HEIGHT: 66 IN | WEIGHT: 173 LBS | SYSTOLIC BLOOD PRESSURE: 164 MMHG | RESPIRATION RATE: 16 BRPM | DIASTOLIC BLOOD PRESSURE: 86 MMHG | BODY MASS INDEX: 27.8 KG/M2 | TEMPERATURE: 98.1 F | HEART RATE: 94 BPM | OXYGEN SATURATION: 98 %

## 2021-03-11 DIAGNOSIS — R41.3 MEMORY CHANGE: Primary | ICD-10-CM

## 2021-03-11 DIAGNOSIS — R41.89 DISORGANIZED THOUGHT PROCESS: ICD-10-CM

## 2021-03-11 DIAGNOSIS — F80.1 EXPRESSIVE SPEECH DISORDER: ICD-10-CM

## 2021-03-11 DIAGNOSIS — R03.0 ELEVATED BLOOD PRESSURE READING: ICD-10-CM

## 2021-03-11 PROCEDURE — 99214 OFFICE O/P EST MOD 30 MIN: CPT | Performed by: FAMILY MEDICINE

## 2021-03-11 NOTE — PROGRESS NOTES
Suyapa Pastor is a 80 y.o. male     Chief Complaint   Patient presents with    Memory Loss    Referral / Consult     1. Have you been to the ER, urgent care clinic since your last visit? Hospitalized since your last visit? No    2. Have you seen or consulted any other health care providers outside of the [address] [place] [address] since your last visit? Include any pap smears or colon screening.  No     Visit Vitals  Temp 98.1 °F (36.7 °C) (Temporal)   Ht 5' 6\" (1.676 m)   Wt 173 lb (78.5 kg)   BMI 27.92 kg/m²

## 2021-03-12 NOTE — PROGRESS NOTES
Subjective:     Chief Complaint   Patient presents with    Memory Loss    Referral / Consult        He  is a 80y.o. year old male who presents with his wife with a concern about memory and behavioral issues. Wife wanted to know if he is schizophrenic, has  ADHD. I saw him in November 2020 and prior to that I saw him last in 2017. They wrote his issues in a paper format and brought for my review. She reports that they have been  for about 35 years. She has been noticing this type of behavior since she got  she states but its been worse. She or patient never mentioned or showed any concern about this type of behavior . He works full time. Reports that she notice him start conversation in the middle of the sentence or state sentences backwards. He will start sentences with pronoun instead of noun. He talks a lot and do things without even thinking. If asked then he will say he does not even know why he did that. He is forgetful. He will do same  things over and over again. No hx of stroke, gait abnormality. His BP is high in office. He is not on any med. Lab Results   Component Value Date/Time    TSH 1.290 11/18/2020 12:00 AM    TSH 1.870 10/12/2017 09:51 AM    T4, Free 0.94 10/12/2017 09:51 AM            Pertinent items are noted in HPI.   Objective:     Vitals:    03/11/21 1627 03/11/21 1631   BP: (!) 165/81 (!) 164/86   Pulse: 94    Resp: 16    Temp: 98.1 °F (36.7 °C)    TempSrc: Temporal    SpO2: 98%    Weight: 173 lb (78.5 kg)    Height: 5' 6\" (1.676 m)        Physical Examination: General appearance - alert, well appearing, and in no distress, oriented to person, place, and time and overweight  Mental status - alert, oriented to person, place, and time, normal mood, behavior, speech, dress, motor activity, and thought processes  Ears - bilateral TM's and external ear canals normal  Nose - normal and patent, no erythema, discharge or polyps  Neck - supple, no significant adenopathy, thyroid exam: thyroid is normal in size without nodules or tenderness  Chest - clear to auscultation, no wheezes, rales or rhonchi, symmetric air entry  Heart - normal rate, regular rhythm, normal S1, S2, no murmurs, rubs, clicks or gallops  Neurological - alert, oriented, normal speech, no focal findings or movement disorder noted. Mini Mental Score: 28          No Known Allergies   Social History     Socioeconomic History    Marital status:      Spouse name: Not on file    Number of children: Not on file    Years of education: Not on file    Highest education level: Not on file   Tobacco Use    Smoking status: Former Smoker    Smokeless tobacco: Never Used   Substance and Sexual Activity    Alcohol use: No    Drug use: No    Sexual activity: Yes     Partners: Female      History reviewed. No pertinent family history. Past Surgical History:   Procedure Laterality Date    COLONOSCOPY N/A 9/20/2016    COLONOSCOPY performed by Nanette Arias MD at [address] [place] [address], COLON, DIAGNOSTIC  2006    HX ORTHOPAEDIC  12/2015    removed cyst on back    HX PROSTATECTOMY  2007      Past Medical History:   Diagnosis Date    Cancer [facility]) 2007    prostate    ED (erectile dysfunction)     Hypercholesterolemia     Sebaceous cyst 2/11/2013      Current Outpatient Medications   Medication Sig Dispense Refill    diphenhydrAMINE (Allergy) 25 mg tablet Take 25 mg by mouth every six (6) hours as needed.  erythromycin (ILOTYCIN) ophthalmic ointment I inch ribbon in left eye 4-6 times/day. 1 g 0    simvastatin (ZOCOR) 40 mg tablet TAKE 1 TABLET BY MOUTH NIGHTLY 90 Tab 1        Assessment/ Plan:   Diagnoses and all orders for this visit:    1. Memory change  -     REFERRAL TO NEUROLOGY  -     REFERRAL TO NEUROLOGY  -     CT HEAD WO CONT; Future         MMSE is 28. MME is in good range. Will check CT head.        Clinical psychologist evaluation and neurology referral.  2. Expressive speech disorder  -     REFERRAL TO NEUROLOGY  -     REFERRAL TO NEUROLOGY  -     CT HEAD WO CONT; Future    3. Disorganized thought process  -     REFERRAL TO NEUROLOGY  -     REFERRAL TO NEUROLOGY  -     CT HEAD WO CONT; Future    4. Elevated blood pressure reading      BP is elevated . Check BP at home and follow up if BP stays high. Spent > 30 minutes with patient. Medication risks/benefits/costs/interactions/alternatives discussed with patient. Advised patient to call back or return to office if symptoms worsen/change/persist. If patient cannot reach us or should anything more severe/urgent arise he/she should proceed directly to the nearest emergency department. Discussed expected course/resolution/complications of diagnosis in detail with patient. Patient given a written after visit summary which includes her diagnoses, current medications and vitals. Patient expressed understanding with the diagnosis and plan. Follow-up and Dispositions    · Return if symptoms worsen or fail to improve.

## 2021-03-15 ENCOUNTER — TELEPHONE (OUTPATIENT)
Dept: PRIMARY CARE CLINIC | Age: 81
End: 2021-03-15

## 2021-03-15 NOTE — TELEPHONE ENCOUNTER
Pancho called on behalf of patient, Marcellus Babinski. Jadiel. Patient wants to schedule a CT Scan of head and neck. Jeanne Barksdale called because they do not see an authorization for this procedure. Please advise patient as to whether the authorization for this procedure is coming from Dr. Radha Farmer office, or if the referring Neurologist will be the ordering physician.

## 2021-03-15 NOTE — TELEPHONE ENCOUNTER
Order was placed already on  3/11/2021. I believe the  will notify patient and send for authorization to Jessica Parisi.

## 2021-03-17 NOTE — TELEPHONE ENCOUNTER
Patients wife called and stated pt is having ct done at [facility], they told him we have to get the auth for the ct.

## 2021-03-18 NOTE — TELEPHONE ENCOUNTER
We do not do insurance prior auth or approval for imaging. Scheduling department will do that. Please call patient to notify and see what exactly she is asking. Thanks.

## 2021-03-29 ENCOUNTER — HOSPITAL ENCOUNTER (OUTPATIENT)
Dept: CT IMAGING | Age: 81
Discharge: HOME | End: 2021-03-29
Payer: COMMERCIAL

## 2021-03-29 DIAGNOSIS — R41.89 DISORGANIZED THOUGHT PROCESS: ICD-10-CM

## 2021-03-29 DIAGNOSIS — R41.3 MEMORY CHANGE: ICD-10-CM

## 2021-03-29 DIAGNOSIS — F80.1 EXPRESSIVE SPEECH DISORDER: ICD-10-CM

## 2021-03-29 PROCEDURE — 70450 CT HEAD/BRAIN W/O DYE: CPT | Performed by: FAMILY MEDICINE

## 2021-03-30 NOTE — PROGRESS NOTES
Called pts phone, spoke with wife. Gave pt's message about CT scan from Dr. Bobby Viveros and asked them to make appt with Neurologist.  Pt's wife verbalized understanding.

## 2021-06-15 ENCOUNTER — OFFICE VISIT (OUTPATIENT)
Dept: NEUROLOGY | Age: 81
End: 2021-06-15
Payer: COMMERCIAL

## 2021-06-15 VITALS
DIASTOLIC BLOOD PRESSURE: 80 MMHG | HEART RATE: 88 BPM | BODY MASS INDEX: 27.53 KG/M2 | HEIGHT: 67 IN | OXYGEN SATURATION: 98 % | SYSTOLIC BLOOD PRESSURE: 130 MMHG | WEIGHT: 175.4 LBS

## 2021-06-15 DIAGNOSIS — F32.A DEPRESSION, UNSPECIFIED DEPRESSION TYPE: ICD-10-CM

## 2021-06-15 DIAGNOSIS — R41.3 MEMORY LOSS: Primary | ICD-10-CM

## 2021-06-15 PROCEDURE — 99214 OFFICE O/P EST MOD 30 MIN: CPT | Performed by: PSYCHIATRY & NEUROLOGY

## 2021-06-15 RX ORDER — SERTRALINE HYDROCHLORIDE 25 MG/1
25 TABLET, FILM COATED ORAL DAILY
Qty: 90 TABLET | Refills: 1 | Status: SHIPPED | OUTPATIENT
Start: 2021-06-15 | End: 2021-11-02 | Stop reason: SDUPTHER

## 2021-06-15 NOTE — PROGRESS NOTES
Neurology Consult Note      HISTORY PROVIDED BY: patient and spouse    Chief Complaint:   Chief Complaint   Patient presents with    New Patient    Memory Loss     and confusion      Subjective:    Nohemi Baldwin is a 80 y.o. right handed male who presents in consultation for memory loss. He notes that he is having trouble expressing himself, may start in the middle of sentence when talking, and has memory loss. Will forget what his wife said, not getting lost, not having trouble working as a . His wife notes that they have been  29 years and shortly after they got , she began noticing difficulties. Gave an example of talking about someone using only pronouns and not acknowledging who the subject is. He doesn't answer questions in a clear way, gives example of asking him, \"Do you want those cucumbers? \" He responds \"Yes, you can have them. \"  She states that they are here now b/c she is tired of it. He does feel things have gotten worse from a memory standpoint and that he has become easily distracted. She asks about the possibility of ADHD. Notes that he parked in a handicap spot recently and did not realize it. Not completing tasks as directed by his wife. He has trouble controlling anger. No family h/o dementia. Takes benadryl daily for allergies. Sleeps wells, +snores, may wake to go to the restroom 2-3 times a night.      Past Medical History:   Diagnosis Date    ED (erectile dysfunction)     Hypercholesterolemia     Prediabetes     Prostate cancer ([facility] [address] 2007    surgery    Sebaceous cyst 2/11/2013      Past Surgical History:   Procedure Laterality Date    COLONOSCOPY N/A 9/20/2016    COLONOSCOPY performed by Nakul Romero MD at [address] [place] [address] [place], COLON, DIAGNOSTIC  2006    HX ORTHOPAEDIC  12/2015    removed cyst on back    HX PROSTATECTOMY  2007      Social History     Socioeconomic History    Marital status:      Spouse name: Not on file    Number of children: Not on file    Years of education: Not on file    Highest education level: Not on file   Occupational History    Occupation:    Tobacco Use    Smoking status: Former Smoker     Quit date:      Years since quittin.5    Smokeless tobacco: Never Used   Vaping Use    Vaping Use: Never used   Substance and Sexual Activity    Alcohol use: No    Drug use: Not Currently     Comment: Quit 35 years ago    Sexual activity: Yes     Partners: Female   Other Topics Concern    Not on file   Social History Narrative    , lives in [place]. Completed 11th grade, did well, no learning disabilities. Social Determinants of Health     Financial Resource Strain:     Difficulty of Paying Living Expenses:    Food Insecurity:     Worried About Running Out of Food in the Last Year:     [address] in the Last Year:    Transportation Needs:     Lack of Transportation (Medical):  Lack of Transportation (Non-Medical):    Physical Activity:     Days of Exercise per Week:     Minutes of Exercise per Session:    Stress:     Feeling of Stress :    Social Connections:     Frequency of Communication with Friends and Family:     Frequency of Social Gatherings with Friends and Family:     Attends [employer] Services:     Active Member of Clubs or Organizations:     Attends Club or Organization Meetings:     Marital Status:    Intimate Partner Violence:     Fear of Current or Ex-Partner:     Emotionally Abused:     Physically Abused:     Sexually Abused:      Family History   Problem Relation Age of Onset    Breast Cancer Mother         Dec 45yo    Other Father         Head injury, Dec 45yo    Cancer Sister         esophageal    No Known Problems Sister     Arthritis-osteo Daughter          Objective:   Review of Systems   Respiratory:        Snoring     Gastrointestinal: Positive for abdominal pain. Psychiatric/Behavioral: Positive for memory loss.    All other systems reviewed and are negative. No Known Allergies     Meds:  Outpatient Medications Prior to Visit   Medication Sig Dispense Refill    diphenhydrAMINE (Allergy) 25 mg tablet Take 25 mg by mouth every six (6) hours as needed.  simvastatin (ZOCOR) 40 mg tablet TAKE 1 TABLET BY MOUTH NIGHTLY (Patient not taking: Reported on 6/15/2021) 90 Tab 1    erythromycin (ILOTYCIN) ophthalmic ointment I inch ribbon in left eye 4-6 times/day. (Patient not taking: Reported on 6/15/2021) 1 g 0     No facility-administered medications prior to visit. Imaging:  MRI Results (most recent):  No results found for this or any previous visit. CT Results (most recent):  Results from Hospital Encounter encounter on 03/29/21    CT HEAD WO CONT    Narrative  INDICATION: Expressive speech disorder. Disorganized thought process. Memory  change. Exam: Noncontrast CT of the brain is performed with 5 mm collimation. CT dose reduction was achieved with the use of the standardized protocol  tailored for this examination and automatic exposure control for dose  modulation. FINDINGS: There is no acute intracranial hemorrhage, mass, mass effect or  herniation. Ventricular system is normal. The gray-white matter differentiation  is well-preserved. The mastoid air cells are well pneumatized. The visualized  paranasal sinuses are normal.    Impression  No acute intracranial hemorrhage, mass or infarct.        Reviewed records in [facility] tab today    Lab Review   Results for orders placed or performed in visit on 11/16/20   HEMOGLOBIN A1C WITH EAG   Result Value Ref Range    Hemoglobin A1c 6.3 (H) 4.8 - 5.6 %    Estimated average glucose 134 mg/dL   PSA W/ REFLX FREE PSA   Result Value Ref Range    Prostate Specific Ag 0.1 0.0 - 4.0 ng/mL    Reflex Criteria Comment    LIPID PANEL   Result Value Ref Range    Cholesterol, total 220 (H) 100 - 199 mg/dL    Triglyceride 108 0 - 149 mg/dL    HDL Cholesterol 35 (L) >39 mg/dL VLDL, calculated 20 5 - 40 mg/dL    LDL, calculated 165 (H) 0 - 99 mg/dL   METABOLIC PANEL, COMPREHENSIVE   Result Value Ref Range    Glucose 87 65 - 99 mg/dL    BUN 9 8 - 27 mg/dL    Creatinine 1.07 0.76 - 1.27 mg/dL    GFR est non-AA 66 >59 mL/min/1.73    GFR est AA 76 >59 mL/min/1.73    BUN/Creatinine ratio 8 (L) 10 - 24    Sodium 141 134 - 144 mmol/L    Potassium 4.2 3.5 - 5.2 mmol/L    Chloride 104 96 - 106 mmol/L    CO2 24 20 - 29 mmol/L    Calcium 9.1 8.6 - 10.2 mg/dL    Protein, total 6.8 6.0 - 8.5 g/dL    Albumin 4.2 3.7 - 4.7 g/dL    GLOBULIN, TOTAL 2.6 1.5 - 4.5 g/dL    A-G Ratio 1.6 1.2 - 2.2    Bilirubin, total 0.3 0.0 - 1.2 mg/dL    Alk. phosphatase 93 39 - 117 IU/L    AST (SGOT) 19 0 - 40 IU/L    ALT (SGPT) 11 0 - 44 IU/L   CBC WITH AUTOMATED DIFF   Result Value Ref Range    WBC 5.6 3.4 - 10.8 x10E3/uL    RBC 4.77 4.14 - 5.80 x10E6/uL    HGB 14.2 13.0 - 17.7 g/dL    HCT 41.9 37.5 - 51.0 %    MCV 88 79 - 97 fL    MCH 29.8 26.6 - 33.0 pg    MCHC 33.9 31.5 - 35.7 g/dL    RDW 13.5 11.6 - 15.4 %    PLATELET 905 621 - 716 x10E3/uL    NEUTROPHILS 51 Not Estab. %    Lymphocytes 38 Not Estab. %    MONOCYTES 9 Not Estab. %    EOSINOPHILS 1 Not Estab. %    BASOPHILS 1 Not Estab. %    ABS. NEUTROPHILS 2.9 1.4 - 7.0 x10E3/uL    Abs Lymphocytes 2.1 0.7 - 3.1 x10E3/uL    ABS. MONOCYTES 0.5 0.1 - 0.9 x10E3/uL    ABS. EOSINOPHILS 0.1 0.0 - 0.4 x10E3/uL    ABS. BASOPHILS 0.0 0.0 - 0.2 x10E3/uL    IMMATURE GRANULOCYTES 0 Not Estab. %    ABS. IMM. GRANS. 0.0 0.0 - 0.1 x10E3/uL   THYROID CASCADE PROFILE   Result Value Ref Range    TSH 1.290 0.450 - 4.500 uIU/mL        Exam:  Visit Vitals  /80   Pulse 88   Ht 5' 7\" (1.702 m)   Wt 175 lb 6.4 oz (79.6 kg)   SpO2 98%   BMI 27.47 kg/m²     General:  Alert, cooperative, no distress. Head:  Normocephalic, without obvious abnormality, atraumatic.    Respiratory:  Heart:   Non labored breathing  Regular rate and rhythm, no murmurs   Neck:   2+ carotids, no bruits Extremities: Warm, no cyanosis or edema. Pulses: 2+ radial pulses. Neurologic:  MS: Alert, appropriate, speech intact. Language- see MMSE. Attention and fund of knowledge appropriate. Recent and remote memory intact.   Cranial Nerves:  II: visual fields Full to confrontation   II: pupils Equal, round, reactive to light   II: optic disc    III,VII: ptosis none   III,IV,VI: extraocular muscles  EOMI, no nystagmus or diplopia   V: facial light touch sensation  normal   VII: facial muscle function   symmetric   VIII: hearing Dec to normal conversation   IX: soft palate elevation  normal   XI: trapezius strength  5/5   XI: sternocleidomastoid strength 5/5   XII: tongue  Midline     Motor: normal bulk and tone, no tremor              Strength: 5/5 throughout, no PD  Sensory: intact to LT, PP  Coordination: FTN and HTS intact, CHAR[place]TE intact  Gait: normal gait, dec arm swing on right, holds arm flexed  Reflexes: 2+ symmetric, toes downgoing    Mini Mental State Exam 3/14/2021   What is the Year 1   What is the Season 1   What is the Date 1   What is the Day 1   What is the Month 1   Where are we State 1   Where are we Country 1   Where are we [place] or [facility] 1   Where are we Floor 1   Name three objects, then ask the patient to say them 3   Serial sevens Subtract 7 from 100 in increments 5   Ask for the three objects repeated above 3   Name a pencil 1   Name a watch 1   Have the patient repeat this phrase \"No ifs, ands, or buts\" 1   Three stage command: Take the paper in your right hand 1   Fold the paper in half 1   Put the paper on the floor 1   Read and obey the following: CLOSE YOUR EYES 1   Have the patient write a sentence 1   Have the patient copy a figure 0   Mini Mental Score 28          Assessment/Plan   Pt is a 80 y.o. right handed male with at least 34y history of difficulties communicating, starting sentences mid sentence, identifying the subject of his sentence with only pronouns, will not remember what his wife said or not follow directions she has given him. He describes himself as being easily distracted. He has not had any trouble driving or working as a . Exam with MMSE score 28/30 missing place, unable to copy, decreased hearing, holds right arm flexed when walking. Discussed the possibility that he does have underlying ADHD and now with age-related changes he is having more difficulty, hearing loss and educational level may also be playing a part. Discussed his mood and patient acknowledges depression and easily losing his temper, and this could also be contributing to difficulties. I recommend MRI of the brain without contrast to assess for structural etiology of his language/memory difficulties especially given focal finding on exam.  Will start Zoloft 25 mg daily for mood, side effects reviewed and patient encouraged to seek counseling. Briefly discussed his sleep, concerning for sleep apnea, but will hold off on referral for sleep study for now. We will have the patient follow-up in approximately 3 months, instructed to call in the interim with any questions or concerns. ICD-10-CM ICD-9-CM    1. Memory loss  R41.3 780.93 MRI BRAIN WO CONT   2. Depression, unspecified depression type  F32.9 311        Signed:   Todd Coles MD  6/15/2021

## 2021-06-15 NOTE — PATIENT INSTRUCTIONS
-Please see a counselor   -Start Zoloft 25mg daily for anxiety/depression. Call if you start feeling more depressed after taking this. -MRI brain ordered.

## 2021-06-15 NOTE — LETTER
6/25/2021    Patient: Joie Clifton   YOB: 1940   Date of Visit: 6/15/2021     Claudeen Opitz, MD  [address] [place], [place] [address]    Dear Claudeen Opitz, MD,      Thank you for referring Mr. Jelena Jean Baptiste to [address] [place] [address] for evaluation. My notes for this consultation are attached. If you have questions, please do not hesitate to call me. I look forward to following your patient along with you.       Sincerely,    Jia Andersen MD

## 2021-06-24 ENCOUNTER — HOSPITAL ENCOUNTER (OUTPATIENT)
Dept: MRI IMAGING | Age: 81
Discharge: HOME | End: 2021-06-24
Payer: COMMERCIAL

## 2021-06-24 DIAGNOSIS — R41.3 MEMORY LOSS: ICD-10-CM

## 2021-06-24 PROCEDURE — 70551 MRI BRAIN STEM W/O DYE: CPT | Performed by: PSYCHIATRY & NEUROLOGY

## 2021-07-01 ENCOUNTER — TELEPHONE (OUTPATIENT)
Dept: NEUROLOGY | Age: 81
End: 2021-07-01

## 2021-07-01 NOTE — PROGRESS NOTES
Spoke w/ Wife. Given MRI results 6/24/21. Inform that can be review at follow up appt. Dr. Katya Howe recommend he starts taking aspirin 81mg daily.

## 2021-07-01 NOTE — PROGRESS NOTES
Erin Session - Please call pt/pt's wife: MRI brain wo contrast 6/24/21 reveals chronic changes, no acute problem. He has had small strokes in the past, there is no way to know when these occurred. We can review together at his follow up appt. Recommend he start taking aspirin 81mg daily.

## 2021-08-20 NOTE — PROGRESS NOTES
Subjective:     Chief Complaint   Patient presents with    Bunions     bottom of right foot x last 6 months. Getting bigger        He  is a 78y.o. year old male with h/o HLD who presents with a complain of pain in his bottom of the right foot for more than 6 month. Its hurts with walking. No injury. Did not try any medication for the pain. Pertinent items are noted in HPI. Objective:     Vitals:    04/14/17 1117   BP: 130/76   Pulse: 73   Resp: 17   Temp: 96.8 °F (36 °C)   TempSrc: Oral   SpO2: 98%   Weight: 170 lb (77.1 kg)   Height: 5' 6\" (1.676 m)       Physical Examination: General appearance - alert, well appearing, and in no distress, oriented to person, place, and time and normal appearing weight  Mental status - alert, oriented to person, place, and time, normal mood, behavior, speech, dress, motor activity, and thought processes  Extremities - no pedal edema noted,  good pulses, normal color, temperature and sensation. Thick great toe nail. Flat feet. There are several callus and a very painful corn on the lateral side of the right feet where patient is showing the point of tenderness. No Known Allergies   Social History     Social History    Marital status:      Spouse name: N/A    Number of children: N/A    Years of education: N/A     Social History Main Topics    Smoking status: Former Smoker    Smokeless tobacco: Never Used    Alcohol use No    Drug use: No    Sexual activity: Yes     Partners: Female     Other Topics Concern    None     Social History Narrative      History reviewed. No pertinent family history.    Past Surgical History:   Procedure Laterality Date    COLONOSCOPY N/A 9/20/2016    COLONOSCOPY performed by Primitivo Torres MD at [facility]. [address], COLON, DIAGNOSTIC  2006    HX ORTHOPAEDIC  12/2015    removed cyst on back    HX PROSTATECTOMY  2007      Past Medical History:   Diagnosis Date    Cancer [facility]) 2007    prostate    ED (erectile dysfunction)     Hypercholesterolemia     Sebaceous cyst 2/11/2013      Current Outpatient Prescriptions   Medication Sig Dispense Refill    simvastatin (ZOCOR) 40 mg tablet Take 1 Tab by mouth nightly. 90 Tab 1        Assessment/ Plan:   Malia Barlow was seen today for bunions. Diagnoses and all orders for this visit:    Corn of foot  -  Discussed home treatment therapy. -   Also provided the contact info for podiatrist.          Medication risks/benefits/costs/interactions/alternatives discussed with patient. Advised patient to call back or return to office if symptoms worsen/change/persist. If patient cannot reach us or should anything more severe/urgent arise he/she should proceed directly to the nearest emergency department. Discussed expected course/resolution/complications of diagnosis in detail with patient. Patient given a written after visit summary which includes her diagnoses, current medications and vitals. Patient expressed understanding with the diagnosis and plan. Follow-up Disposition:  Return if symptoms worsen or fail to improve. regular rate and rhythm/no rub/no murmur/normal PMI

## 2021-11-02 ENCOUNTER — DOCUMENTATION ONLY (OUTPATIENT)
Dept: NEUROLOGY | Age: 81
End: 2021-11-02

## 2021-11-02 ENCOUNTER — OFFICE VISIT (OUTPATIENT)
Dept: NEUROLOGY | Age: 81
End: 2021-11-02
Payer: COMMERCIAL

## 2021-11-02 VITALS
HEART RATE: 79 BPM | DIASTOLIC BLOOD PRESSURE: 70 MMHG | SYSTOLIC BLOOD PRESSURE: 140 MMHG | WEIGHT: 173.6 LBS | OXYGEN SATURATION: 100 % | BODY MASS INDEX: 27.25 KG/M2 | HEIGHT: 67 IN

## 2021-11-02 DIAGNOSIS — R41.3 MEMORY LOSS: Primary | ICD-10-CM

## 2021-11-02 DIAGNOSIS — F32.A DEPRESSION, UNSPECIFIED DEPRESSION TYPE: ICD-10-CM

## 2021-11-02 PROCEDURE — 99214 OFFICE O/P EST MOD 30 MIN: CPT | Performed by: PSYCHIATRY & NEUROLOGY

## 2021-11-02 RX ORDER — SERTRALINE HYDROCHLORIDE 25 MG/1
25 TABLET, FILM COATED ORAL DAILY
Qty: 90 TABLET | Refills: 1 | Status: SHIPPED | OUTPATIENT
Start: 2021-11-02 | End: 2022-06-03

## 2021-11-02 NOTE — LETTER
11/2/2021 Patient: Shala Leonard YOB: 1940 Date of Visit: 11/2/2021 Arjun Marie MD 
[address] [place] [address] [place] Dear Arjun Marie MD, Thank you for referring Mr. Daina Mcmanus to [address] [place] [address] for evaluation. My notes for this consultation are attached. If you have questions, please do not hesitate to call me. I look forward to following your patient along with you. Sincerely, Fatuma Page MD

## 2021-12-09 RX ORDER — SERTRALINE HYDROCHLORIDE 25 MG/1
25 TABLET, FILM COATED ORAL DAILY
Qty: 90 TABLET | Refills: 1 | OUTPATIENT
Start: 2021-12-09

## 2021-12-09 NOTE — TELEPHONE ENCOUNTER
----- Message from Rajendra Knott sent at 12/8/2021  3:27 PM EST -----  Subject: Refill Request    QUESTIONS  Name of Medication? sertraline (ZOLOFT) 25 mg tablet  Patient-reported dosage and instructions? 1 qd  How many days do you have left? 5  Preferred Pharmacy? [id number] Pharmacy phone number (if available)? [phone]  ---------------------------------------------------------------------------  --------------  CALL BACK INFO  What is the best way for the office to contact you? OK to leave message on   voicemail  Preferred Call Back Phone Number?  [phone]

## 2021-12-09 NOTE — TELEPHONE ENCOUNTER
Requested Prescriptions     Pending Prescriptions Disp Refills    sertraline (ZOLOFT) 25 mg tablet 90 Tablet 1     Sig: Take 1 Tablet by mouth daily.         Last Visit 3/11/21  Last Refill 11/2/21

## 2021-12-16 ENCOUNTER — TELEPHONE (OUTPATIENT)
Dept: PRIMARY CARE CLINIC | Age: 81
End: 2021-12-16

## 2021-12-16 NOTE — TELEPHONE ENCOUNTER
----- Message from Abdiel Mccabe sent at 12/8/2021  3:27 PM EST -----  Subject: Refill Request    QUESTIONS  Name of Medication? sertraline (ZOLOFT) 25 mg tablet  Patient-reported dosage and instructions? 1 qd  How many days do you have left? 5  Preferred Pharmacy? [id number] Pharmacy phone number (if available)? [phone]  ---------------------------------------------------------------------------  --------------  CALL BACK INFO  What is the best way for the office to contact you? OK to leave message on   voicemail  Preferred Call Back Phone Number?  [phone]

## 2022-02-25 ENCOUNTER — OFFICE VISIT (OUTPATIENT)
Dept: PRIMARY CARE CLINIC | Age: 82
End: 2022-02-25
Payer: COMMERCIAL

## 2022-02-25 VITALS
WEIGHT: 174 LBS | SYSTOLIC BLOOD PRESSURE: 154 MMHG | TEMPERATURE: 97.6 F | BODY MASS INDEX: 27.31 KG/M2 | HEART RATE: 71 BPM | HEIGHT: 67 IN | OXYGEN SATURATION: 99 % | DIASTOLIC BLOOD PRESSURE: 61 MMHG | RESPIRATION RATE: 16 BRPM

## 2022-02-25 DIAGNOSIS — I10 ESSENTIAL (PRIMARY) HYPERTENSION: Primary | ICD-10-CM

## 2022-02-25 DIAGNOSIS — R73.03 PREDIABETES: ICD-10-CM

## 2022-02-25 DIAGNOSIS — E78.5 HYPERLIPIDEMIA LDL GOAL <100: ICD-10-CM

## 2022-02-25 PROCEDURE — 99214 OFFICE O/P EST MOD 30 MIN: CPT | Performed by: FAMILY MEDICINE

## 2022-02-25 RX ORDER — AMLODIPINE BESYLATE 2.5 MG/1
2.5 TABLET ORAL DAILY
Qty: 90 TABLET | Refills: 0 | Status: SHIPPED | OUTPATIENT
Start: 2022-02-25 | End: 2022-05-24 | Stop reason: SDUPTHER

## 2022-02-25 NOTE — PROGRESS NOTES
Identified pt with two pt identifiers(name and ). Chief Complaint   Patient presents with    Cholesterol Problem    Blood Pressure Check        3 most recent PHQ Screens 2022   Little interest or pleasure in doing things Not at all   Feeling down, depressed, irritable, or hopeless Not at all   Total Score PHQ 2 0        Vitals:    22 1019 22 1023   BP: (!) 165/64 (!) 154/61   Pulse: 71    Resp: 16    Temp: 97.6 °F (36.4 °C)    TempSrc: Temporal    SpO2: 99%    Weight: 174 lb (78.9 kg)    Height: 5' 7\" (1.702 m)        Health Maintenance Due   Topic    Eye Exam Retinal or Dilated     Shingrix Vaccine Age 50> (1 of 2)    A1C test (Diabetic or Prediabetic)     Lipid Screen        1. Have you been to the ER, urgent care clinic since your last visit? Hospitalized since your last visit? No    2. Have you seen or consulted any other health care providers outside of the [address] [place] [address] since your last visit? Include any pap smears or colon screening.  No

## 2022-02-25 NOTE — PROGRESS NOTES
Subjective:     Chief Complaint   Patient presents with    Cholesterol Problem    Blood Pressure Check        He  is a [de-identified]y.o. year old male who presents today for follow up on blood pressure as well as cholesterol. In last visit his blood pressure was high. Does not heck BP at home. Denies any CP, soa, cough. Prediabetes. Lab Results   Component Value Date/Time    Hemoglobin A1c 6.3 (H) 2020 12:00 AM         Lab Results   Component Value Date/Time    Cholesterol, total 220 (H) 2020 12:00 AM    HDL Cholesterol 35 (L) 2020 12:00 AM    LDL, calculated 165 (H) 2020 12:00 AM    LDL, calculated 82 10/12/2017 09:51 AM    VLDL, calculated 20 2020 12:00 AM    VLDL, calculated 23 10/12/2017 09:51 AM    Triglyceride 108 2020 12:00 AM         Pertinent items are noted in HPI.   Objective:     Vitals:    22 1019 22 1023   BP: (!) 165/64 (!) 154/61   Pulse: 71    Resp: 16    Temp: 97.6 °F (36.4 °C)    TempSrc: Temporal    SpO2: 99%    Weight: 174 lb (78.9 kg)    Height: 5' 7\" (1.702 m)        Physical Examination: General appearance - alert, well appearing, and in no distress, oriented to person, place, and time and overweight  Mental status - alert, oriented to person, place, and time, normal mood, behavior, speech, dress, motor activity, and thought processes  Chest - clear to auscultation, no wheezes, rales or rhonchi, symmetric air entry  Heart - normal rate, regular rhythm, normal S1, S2, no murmurs, rubs, clicks or gallops    No Known Allergies   Social History     Socioeconomic History    Marital status:    Occupational History    Occupation:    Tobacco Use    Smoking status: Former Smoker     Quit date:      Years since quittin.1    Smokeless tobacco: Never Used   Vaping Use    Vaping Use: Never used   Substance and Sexual Activity    Alcohol use: No    Drug use: Not Currently     Comment: Quit 35 years ago    Sexual activity: Yes Partners: Female   Social History Narrative    , lives in [place] with wife. Completed 11th grade, did well, no learning disabilities. Family History   Problem Relation Age of Onset    Breast Cancer Mother         Dec 45yo    Other Father         Head injury, Dec 45yo    Cancer Sister         esophageal    No Known Problems Sister     OSTEOARTHRITIS Daughter       Past Surgical History:   Procedure Laterality Date    COLONOSCOPY N/A 9/20/2016    COLONOSCOPY [facility] by Santosh Fried MD at [facility] [address], DIAGNOSTIC  2006    HX ORTHOPAEDIC  12/2015    removed cyst on back    HX PROSTATECTOMY  2007      Past Medical History:   Diagnosis Date    ED (erectile dysfunction)     Hypercholesterolemia     Prediabetes     Prostate cancer ([facility] [address] 2007    surgery    Sebaceous cyst 2/11/2013      Current Outpatient Medications   Medication Sig Dispense Refill    sertraline (ZOLOFT) 25 mg tablet Take 1 Tablet by mouth daily. 90 Tablet 1    diphenhydrAMINE (Allergy) 25 mg tablet Take 25 mg by mouth every six (6) hours as needed.  simvastatin (ZOCOR) 40 mg tablet TAKE 1 TABLET BY MOUTH NIGHTLY (Patient not taking: Reported on 6/15/2021) 90 Tab 1    erythromycin (ILOTYCIN) ophthalmic ointment I inch ribbon in left eye 4-6 times/day. (Patient not taking: Reported on 11/2/2021) 1 g 0        Assessment/ Plan:   Diagnoses and all orders for this visit:    1. Essential (primary) hypertension  -  Newly diagnosed HTN. Start    amLODIPine (NORVASC) 2.5 mg tablet; Take 1 Tablet by mouth daily.  -     METABOLIC PANEL, COMPREHENSIVE; Future             Potential side effects discussed. 2. Hyperlipidemia LDL goal <100  -     LIPID PANEL; Future  -     METABOLIC PANEL, COMPREHENSIVE; Future    3. Prediabetes  -     HEMOGLOBIN A1C WITH EAG; Future           Medication risks/benefits/costs/interactions/alternatives discussed with patient.   Advised patient to call back or return to office if symptoms worsen/change/persist. If patient cannot reach us or should anything more severe/urgent arise he/she should proceed directly to the nearest emergency department. Discussed expected course/resolution/complications of diagnosis in detail with patient. Patient given a written after visit summary which includes her diagnoses, current medications and vitals. Patient expressed understanding with the diagnosis and plan. Follow-up and Dispositions    · Return in about 2 months (around 4/25/2022) for Hypertension.

## 2022-02-26 LAB
ALBUMIN SERPL-MCNC: 3.8 G/DL (ref 3.7–4.7)
ALBUMIN/GLOB SERPL: 1.4 {RATIO} (ref 1.2–2.2)
ALP SERPL-CCNC: 89 IU/L (ref 44–121)
ALT SERPL-CCNC: 25 IU/L (ref 0–44)
AST SERPL-CCNC: 20 IU/L (ref 0–40)
BILIRUB SERPL-MCNC: 0.4 MG/DL (ref 0–1.2)
BUN SERPL-MCNC: 9 MG/DL (ref 8–27)
BUN/CREAT SERPL: 9 (ref 10–24)
CALCIUM SERPL-MCNC: 9.3 MG/DL (ref 8.6–10.2)
CHLORIDE SERPL-SCNC: 106 MMOL/L (ref 96–106)
CHOLEST SERPL-MCNC: 251 MG/DL (ref 100–199)
CO2 SERPL-SCNC: 23 MMOL/L (ref 20–29)
CREAT SERPL-MCNC: 0.97 MG/DL (ref 0.76–1.27)
EST. AVERAGE GLUCOSE BLD GHB EST-MCNC: 137 MG/DL
GLOBULIN SER CALC-MCNC: 2.8 G/DL (ref 1.5–4.5)
GLUCOSE SERPL-MCNC: 88 MG/DL (ref 65–99)
HBA1C MFR BLD: 6.4 % (ref 4.8–5.6)
HDLC SERPL-MCNC: 38 MG/DL
LDLC SERPL CALC-MCNC: 197 MG/DL (ref 0–99)
POTASSIUM SERPL-SCNC: 4.4 MMOL/L (ref 3.5–5.2)
PROT SERPL-MCNC: 6.6 G/DL (ref 6–8.5)
SODIUM SERPL-SCNC: 143 MMOL/L (ref 134–144)
TRIGL SERPL-MCNC: 89 MG/DL (ref 0–149)
VLDLC SERPL CALC-MCNC: 16 MG/DL (ref 5–40)

## 2022-03-01 ENCOUNTER — TELEPHONE (OUTPATIENT)
Dept: PRIMARY CARE CLINIC | Age: 82
End: 2022-03-01

## 2022-03-01 DIAGNOSIS — E78.5 HYPERLIPIDEMIA LDL GOAL <100: Primary | ICD-10-CM

## 2022-03-01 RX ORDER — ATORVASTATIN CALCIUM 20 MG/1
20 TABLET, FILM COATED ORAL
Qty: 90 TABLET | Refills: 0 | Status: SHIPPED | OUTPATIENT
Start: 2022-03-01 | End: 2022-06-20 | Stop reason: SDUPTHER

## 2022-03-01 NOTE — TELEPHONE ENCOUNTER
Patients wife called asking why is the patients medicine changing from the Simvastatin to the Atorvastatin. Patient said the Atorvastatin is $26.00 and the Simvastatin didn't cost anything. Patient would like a call back.

## 2022-03-01 NOTE — TELEPHONE ENCOUNTER
Pt's wife states she would like pt to return to taking simvastatin. She does not want pt to start lipitor due to cost. She stated that he has been taking simvastatin for many years and that Dr. Neymar Worley discontinued medication because his cholesterol was well controlled without medication. She states he has not been on medication for approx 1 year, which might be why pt's cholesterol level increased. After consulting with Dr. Neymar Worley, in person, I informed pt's wife that the medication switch was due to better efficacy of lipitor vs zocor. LVM on home phone requesting pt to call to discuss medication management.

## 2022-03-01 NOTE — TELEPHONE ENCOUNTER
Spoke with wife( Pt gave permission to talk). After discussing  both med they aggred to start taking Lipitor.

## 2022-03-01 NOTE — PROGRESS NOTES
Please call patient:    Cholesterol level significantly went up. As per our conversation  I will like you to start on Lipitor 20 mg The prescription has been sent to your pharmacy. Lipitor is taken  once daily with the evening meal.This type of drug is usually highly effective to lower LDL cholesterol and is usually very well tolerated. However, statin-type drugs can potentially injure the liver. Blood tests will be required every 3 months for the first 6 months of therapy, and at 6-12 month intervals thereafter. Damage to the liver, if detected early, can be reversed by stopping the drug. Be alert for persistent nausea, abdominal pain, or yellow jaundice, and report such to me directly. Statin drugs may also cause skeletal muscle injury in rare cases. Be alert for pronounced persistent diffuse muscle pain and discontinue the drug immediately should such symptoms develop. Plan to make a lab appointment in 3 months to recheck your labs. Blood sugar is in prediabetic range. Follow up needed in 6 months.     Kidney and liver function is normal.

## 2022-05-23 DIAGNOSIS — I10 ESSENTIAL (PRIMARY) HYPERTENSION: ICD-10-CM

## 2022-05-23 RX ORDER — AMLODIPINE BESYLATE 2.5 MG/1
2.5 TABLET ORAL DAILY
Qty: 90 TABLET | Refills: 0 | Status: CANCELLED | OUTPATIENT
Start: 2022-05-23

## 2022-05-24 DIAGNOSIS — I10 ESSENTIAL (PRIMARY) HYPERTENSION: ICD-10-CM

## 2022-05-24 RX ORDER — AMLODIPINE BESYLATE 2.5 MG/1
2.5 TABLET ORAL DAILY
Qty: 30 TABLET | Refills: 0 | Status: SHIPPED | OUTPATIENT
Start: 2022-05-24 | End: 2022-06-20

## 2022-05-24 NOTE — TELEPHONE ENCOUNTER
Patient's wife asked if her  could get a refill on his Amlodipine 2.5mg without having to come in for an appt with a new provider because he has to work. She said it was ridiculous that he has to come in after having an office visit in February. She would like a call back.

## 2022-05-24 NOTE — TELEPHONE ENCOUNTER
Spoke to patients wife and informed that patient was supposed to follow up in 2 months per Dr. Nely Ball. Patient blood pressure is no controlled and he will need to be seen, wife stated she can check his BP at home. I informed wife that his blood pressure has to be managed by a provider so he will need to be seen. She will talk to pt when he gets home to call back for an  appointment.

## 2022-06-03 RX ORDER — SERTRALINE HYDROCHLORIDE 25 MG/1
TABLET, FILM COATED ORAL
Qty: 90 TABLET | Refills: 0 | Status: SHIPPED | OUTPATIENT
Start: 2022-06-03 | End: 2022-06-24 | Stop reason: SDUPTHER

## 2022-06-20 DIAGNOSIS — I10 ESSENTIAL (PRIMARY) HYPERTENSION: ICD-10-CM

## 2022-06-20 DIAGNOSIS — E78.5 HYPERLIPIDEMIA LDL GOAL <100: ICD-10-CM

## 2022-06-20 DIAGNOSIS — Z79.899 MEDICATION MANAGEMENT: Primary | ICD-10-CM

## 2022-06-20 RX ORDER — ATORVASTATIN CALCIUM 20 MG/1
20 TABLET, FILM COATED ORAL
Qty: 90 TABLET | Refills: 0 | Status: SHIPPED | OUTPATIENT
Start: 2022-06-20 | End: 2022-06-24 | Stop reason: SDUPTHER

## 2022-06-20 RX ORDER — AMLODIPINE BESYLATE 2.5 MG/1
TABLET ORAL
Qty: 30 TABLET | Refills: 0 | Status: SHIPPED | OUTPATIENT
Start: 2022-06-20 | End: 2022-06-24 | Stop reason: SDUPTHER

## 2022-06-21 ENCOUNTER — TELEPHONE (OUTPATIENT)
Dept: PRIMARY CARE CLINIC | Age: 82
End: 2022-06-21

## 2022-06-21 NOTE — TELEPHONE ENCOUNTER
Called and Informed Patient's Wife to have Patient get fasting labs done prior to seeing Dr. Roxana Harrison on 06/27/2022     Patient's wife stated Patient woukdnt be able to get them done until day of Appointment       Patient's wife was informed for Patient to come 15 before appointment to get Labs done

## 2022-06-23 ENCOUNTER — TELEPHONE (OUTPATIENT)
Dept: PRIMARY CARE CLINIC | Age: 82
End: 2022-06-23

## 2022-06-23 LAB
ALBUMIN SERPL-MCNC: 4.2 G/DL (ref 3.7–4.7)
ALBUMIN/GLOB SERPL: 1.8 {RATIO} (ref 1.2–2.2)
ALP SERPL-CCNC: 98 IU/L (ref 44–121)
ALT SERPL-CCNC: 24 IU/L (ref 0–44)
AST SERPL-CCNC: 23 IU/L (ref 0–40)
BASOPHILS # BLD AUTO: 0 X10E3/UL (ref 0–0.2)
BASOPHILS NFR BLD AUTO: 0 %
BILIRUB SERPL-MCNC: 0.2 MG/DL (ref 0–1.2)
BUN SERPL-MCNC: 11 MG/DL (ref 8–27)
BUN/CREAT SERPL: 10 (ref 10–24)
CALCIUM SERPL-MCNC: 9.5 MG/DL (ref 8.6–10.2)
CHLORIDE SERPL-SCNC: 105 MMOL/L (ref 96–106)
CHOLEST SERPL-MCNC: 149 MG/DL (ref 100–199)
CO2 SERPL-SCNC: 22 MMOL/L (ref 20–29)
CREAT SERPL-MCNC: 1.08 MG/DL (ref 0.76–1.27)
EGFR: 69 ML/MIN/1.73
EOSINOPHIL # BLD AUTO: 0.1 X10E3/UL (ref 0–0.4)
EOSINOPHIL NFR BLD AUTO: 2 %
ERYTHROCYTE [DISTWIDTH] IN BLOOD BY AUTOMATED COUNT: 13.7 % (ref 11.6–15.4)
GLOBULIN SER CALC-MCNC: 2.4 G/DL (ref 1.5–4.5)
GLUCOSE SERPL-MCNC: 100 MG/DL (ref 65–99)
HCT VFR BLD AUTO: 40.5 % (ref 37.5–51)
HDLC SERPL-MCNC: 40 MG/DL
HGB BLD-MCNC: 13.2 G/DL (ref 13–17.7)
IMM GRANULOCYTES # BLD AUTO: 0 X10E3/UL (ref 0–0.1)
IMM GRANULOCYTES NFR BLD AUTO: 0 %
LDLC SERPL CALC-MCNC: 93 MG/DL (ref 0–99)
LYMPHOCYTES # BLD AUTO: 2.3 X10E3/UL (ref 0.7–3.1)
LYMPHOCYTES NFR BLD AUTO: 33 %
MCH RBC QN AUTO: 30.3 PG (ref 26.6–33)
MCHC RBC AUTO-ENTMCNC: 32.6 G/DL (ref 31.5–35.7)
MCV RBC AUTO: 93 FL (ref 79–97)
MONOCYTES # BLD AUTO: 0.6 X10E3/UL (ref 0.1–0.9)
MONOCYTES NFR BLD AUTO: 9 %
NEUTROPHILS # BLD AUTO: 3.8 X10E3/UL (ref 1.4–7)
NEUTROPHILS NFR BLD AUTO: 56 %
PLATELET # BLD AUTO: 222 X10E3/UL (ref 150–450)
POTASSIUM SERPL-SCNC: 4.7 MMOL/L (ref 3.5–5.2)
PROT SERPL-MCNC: 6.6 G/DL (ref 6–8.5)
RBC # BLD AUTO: 4.36 X10E6/UL (ref 4.14–5.8)
SODIUM SERPL-SCNC: 141 MMOL/L (ref 134–144)
TRIGL SERPL-MCNC: 86 MG/DL (ref 0–149)
VLDLC SERPL CALC-MCNC: 16 MG/DL (ref 5–40)
WBC # BLD AUTO: 6.9 X10E3/UL (ref 3.4–10.8)

## 2022-06-24 DIAGNOSIS — I10 ESSENTIAL (PRIMARY) HYPERTENSION: ICD-10-CM

## 2022-06-24 DIAGNOSIS — E78.5 HYPERLIPIDEMIA LDL GOAL <100: ICD-10-CM

## 2022-06-24 RX ORDER — ATORVASTATIN CALCIUM 20 MG/1
20 TABLET, FILM COATED ORAL
Qty: 90 TABLET | Refills: 0 | Status: SHIPPED | OUTPATIENT
Start: 2022-06-24 | End: 2022-06-27

## 2022-06-24 RX ORDER — SERTRALINE HYDROCHLORIDE 25 MG/1
25 TABLET, FILM COATED ORAL DAILY
Qty: 90 TABLET | Refills: 0 | Status: SHIPPED | OUTPATIENT
Start: 2022-06-24 | End: 2022-06-27 | Stop reason: SDUPTHER

## 2022-06-24 RX ORDER — AMLODIPINE BESYLATE 2.5 MG/1
2.5 TABLET ORAL DAILY
Qty: 30 TABLET | Refills: 0 | Status: SHIPPED | OUTPATIENT
Start: 2022-06-24 | End: 2022-06-27

## 2022-06-24 NOTE — TELEPHONE ENCOUNTER
PCP: Nolvia Romero MD    Last appt: 2/25/2022  Future Appointments   Date Time Provider Jeannette Berumen   6/27/2022  8:00 AM Mich Forde NP [facility]       Requested Prescriptions     Pending Prescriptions Disp Refills    amLODIPine (NORVASC) 2.5 mg tablet 30 Tablet 0     Sig: Take 1 Tablet by mouth daily.  atorvastatin (LIPITOR) 20 mg tablet 90 Tablet 0     Sig: Take 1 Tablet by mouth nightly.  sertraline (ZOLOFT) 25 mg tablet 90 Tablet 0     Sig: Take 1 Tablet by mouth daily.          Other Comments: last refill on norvasc and liptior 06/20//22 06/03/22- zoloft

## 2022-06-27 ENCOUNTER — OFFICE VISIT (OUTPATIENT)
Dept: PRIMARY CARE CLINIC | Age: 82
End: 2022-06-27
Payer: COMMERCIAL

## 2022-06-27 VITALS
OXYGEN SATURATION: 98 % | RESPIRATION RATE: 15 BRPM | HEART RATE: 89 BPM | DIASTOLIC BLOOD PRESSURE: 73 MMHG | BODY MASS INDEX: 28.06 KG/M2 | WEIGHT: 178.8 LBS | TEMPERATURE: 97.8 F | HEIGHT: 67 IN | SYSTOLIC BLOOD PRESSURE: 154 MMHG

## 2022-06-27 DIAGNOSIS — E66.3 OVERWEIGHT (BMI 25.0-29.9): ICD-10-CM

## 2022-06-27 DIAGNOSIS — E78.5 HYPERLIPIDEMIA LDL GOAL <70: ICD-10-CM

## 2022-06-27 DIAGNOSIS — R41.3 MEMORY LOSS: ICD-10-CM

## 2022-06-27 DIAGNOSIS — I10 PRIMARY HYPERTENSION: Primary | ICD-10-CM

## 2022-06-27 DIAGNOSIS — Z76.89 ENCOUNTER TO ESTABLISH CARE: ICD-10-CM

## 2022-06-27 DIAGNOSIS — F32.A DEPRESSION, UNSPECIFIED DEPRESSION TYPE: ICD-10-CM

## 2022-06-27 DIAGNOSIS — C61 PROSTATE CANCER (HCC): ICD-10-CM

## 2022-06-27 DIAGNOSIS — E11.9 TYPE 2 DIABETES MELLITUS WITHOUT COMPLICATION, WITHOUT LONG-TERM CURRENT USE OF INSULIN (HCC): ICD-10-CM

## 2022-06-27 LAB — HBA1C MFR BLD HPLC: 6.4 %

## 2022-06-27 PROCEDURE — 83036 HEMOGLOBIN GLYCOSYLATED A1C: CPT | Performed by: NURSE PRACTITIONER

## 2022-06-27 PROCEDURE — 3044F HG A1C LEVEL LT 7.0%: CPT | Performed by: NURSE PRACTITIONER

## 2022-06-27 PROCEDURE — 99214 OFFICE O/P EST MOD 30 MIN: CPT | Performed by: NURSE PRACTITIONER

## 2022-06-27 PROCEDURE — 1123F ACP DISCUSS/DSCN MKR DOCD: CPT | Performed by: NURSE PRACTITIONER

## 2022-06-27 RX ORDER — ATORVASTATIN CALCIUM 20 MG/1
30 TABLET, FILM COATED ORAL DAILY
Qty: 45 TABLET | Refills: 0 | Status: SHIPPED | OUTPATIENT
Start: 2022-06-27 | End: 2022-08-22 | Stop reason: SDUPTHER

## 2022-06-27 RX ORDER — SERTRALINE HYDROCHLORIDE 25 MG/1
25 TABLET, FILM COATED ORAL DAILY
Qty: 90 TABLET | Refills: 0 | Status: SHIPPED | OUTPATIENT
Start: 2022-06-27 | End: 2022-10-17 | Stop reason: SDUPTHER

## 2022-06-27 RX ORDER — AMLODIPINE BESYLATE 5 MG/1
5 TABLET ORAL DAILY
Qty: 45 TABLET | Refills: 0 | Status: SHIPPED | OUTPATIENT
Start: 2022-06-27 | End: 2022-10-06

## 2022-06-27 NOTE — PROGRESS NOTES
[facility]   [address] [facility], [address] [place] [address]  P: [phone]  F: [phone]    SUBJECTIVE     HPI:     Nathaniel Rogers is a [de-identified] y.o. male who is seen in the clinic for   Chief Complaint   Patient presents with    New Patient      The patient presents today to establish care and for management of co-morbidities. Routine labs were gotten on . HTN: Currently on Amlodipine 2.5 mg once daily. Does not take BP at home. Has a cuff. DM II: HA1C today is 6.4. Controlled via diet and exercise. HLD: Lipid panel was unremarkable other than his LDL being 93. Currently on Lipitor 20 mg QHS. Depression: Currently on Zoloft 25 mg QHS. Denies any SI. Went to Neurology in November for unspecified Memory Loss. They referred him to NeuroPsychiatry. He has not made an appointment with them yet. Prostate Ca: In , a radical prostatectomy was performed.      Patient Active Problem List    Diagnosis    Hyperlipidemia LDL goal <100    Prediabetes    Prostate cancer ([address]    Sebaceous cyst        Past Medical History:   Diagnosis Date    ED (erectile dysfunction)     Hypercholesterolemia     Prediabetes     Prostate cancer ([facility] [address] 2007    surgery    Sebaceous cyst 2013     Past Surgical History:   Procedure Laterality Date    COLONOSCOPY N/A 2016    COLONOSCOPY performed by Salomón Whatley MD at [facility]. [facility], [address], DIAGNOSTIC  2006    HX ORTHOPAEDIC  2015    removed cyst on back    HX PROSTATECTOMY  2007     Social History     Socioeconomic History    Marital status:      Spouse name: Not on file    Number of children: Not on file    Years of education: Not on file    Highest education level: Not on file   Occupational History    Occupation:    Tobacco Use    Smoking status: Former Smoker     Quit date:      Years since quittin.5    Smokeless tobacco: Never Used   Vaping Use    Vaping Use: Never used Substance and Sexual Activity    Alcohol use: No    Drug use: Not Currently     Comment: Quit 35 years ago    Sexual activity: Yes     Partners: Female   Other Topics Concern    Not on file   Social History Narrative    , lives [place] [facility] with wife. Completed 11th grade, did well, no learning disabilities. Social Determinants of Health     Financial Resource Strain:     Difficulty of Paying Living Expenses: Not on file   Food Insecurity:     Worried About Running Out of Food in the Last Year: Not on file    Lianna of Food in the Last Year: Not on file   Transportation Needs:     Lack of Transportation (Medical): Not on file    Lack of Transportation (Non-Medical):  Not on file   Physical Activity:     Days of Exercise per Week: Not on file    Minutes of Exercise per Session: Not on file   Stress:     Feeling of Stress : Not on file   Social Connections:     Frequency of Communication with Friends and Family: Not on file    Frequency of Social Gatherings with Friends and Family: Not on file    Attends [employer] Services: Not on file    Active Member of [address] [place] [address] or Organizations: Not on file    Attends Club or Organization Meetings: Not on file    Marital Status: Not on file   Intimate Partner Violence:     Fear of Current or Ex-Partner: Not on file    Emotionally Abused: Not on file    Physically Abused: Not on file    Sexually Abused: Not on file   Housing Stability:     Unable to Pay for Housing in the Last Year: Not on file    Number of [place] in the Last Year: Not on file    Unstable Housing in the Last Year: Not on file     Family History   Problem Relation Age of Onset    Breast Cancer Mother         Dec 45yo    Other Father         Head injury, Dec 45yo    Cancer Sister         esophageal    No Known Problems Sister     OSTEOARTHRITIS Daughter      Immunization History   Administered Date(s) Administered    COVID-19, [employer] top, DILUTE for use, 12+ yrs, 30mcg/0.3mL dose 03/06/2021, 03/27/2021, 11/18/2021    Influenza High Dose Vaccine PF 10/11/2017, 10/19/2021    Influenza Vaccine Split 10/13/2010    Influenza, Quadrivalent, Adjuvanted (>65 Yrs FLUAD QUAD [id number]) 10/19/2020    Pneumococcal Conjugate (PCV-13) 08/18/2016    Pneumococcal Polysaccharide (PPSV-23) 08/03/2015    Tdap 08/03/2015      No Known Allergies    Office Visit on 06/27/2022   Component Date Value Ref Range Status    Hemoglobin A1c (POC) 06/27/2022 6.4  % Final   Orders Only on 06/20/2022   Component Date Value Ref Range Status    WBC 06/22/2022 6.9  3.4 - 10.8 x10E3/uL Final    RBC 06/22/2022 4.36  4.14 - 5.80 x10E6/uL Final    HGB 06/22/2022 13.2  13.0 - 17.7 g/dL Final    HCT 06/22/2022 40.5  37.5 - 51.0 % Final    MCV 06/22/2022 93  79 - 97 fL Final    MCH 06/22/2022 30.3  26.6 - 33.0 pg Final    MCHC 06/22/2022 32.6  31.5 - 35.7 g/dL Final    RDW 06/22/2022 13.7  11.6 - 15.4 % Final    PLATELET 07/96/5599 228  150 - 450 x10E3/uL Final    NEUTROPHILS 06/22/2022 56  Not Estab. % Final    Lymphocytes 06/22/2022 33  Not Estab. % Final    MONOCYTES 06/22/2022 9  Not Estab. % Final    EOSINOPHILS 06/22/2022 2  Not Estab. % Final    BASOPHILS 06/22/2022 0  Not Estab. % Final    ABS. NEUTROPHILS 06/22/2022 3.8  1.4 - 7.0 x10E3/uL Final    Abs Lymphocytes 06/22/2022 2.3  0.7 - 3.1 x10E3/uL Final    ABS. MONOCYTES 06/22/2022 0.6  0.1 - 0.9 x10E3/uL Final    ABS. EOSINOPHILS 06/22/2022 0.1  0.0 - 0.4 x10E3/uL Final    ABS. BASOPHILS 06/22/2022 0.0  0.0 - 0.2 x10E3/uL Final    IMMATURE GRANULOCYTES 06/22/2022 0  Not Estab. % Final    ABS. IMM.  GRANS. 06/22/2022 0.0  0.0 - 0.1 x10E3/uL Final    Cholesterol, total 06/22/2022 149  100 - 199 mg/dL Final    Triglyceride 06/22/2022 86  0 - 149 mg/dL Final    HDL Cholesterol 06/22/2022 40  >39 mg/dL Final    VLDL, calculated 06/22/2022 16  5 - 40 mg/dL Final    LDL, calculated 06/22/2022 93  0 - 99 mg/dL Final    Glucose 06/22/2022 100* 65 - 99 mg/dL Final    BUN 06/22/2022 11  8 - 27 mg/dL Final    Creatinine 06/22/2022 1.08  0.76 - 1.27 mg/dL Final    eGFR 06/22/2022 69  >59 mL/min/1.73 Final    BUN/Creatinine ratio 06/22/2022 10  10 - 24 Final    Sodium 06/22/2022 141  134 - 144 mmol/L Final    Potassium 06/22/2022 4.7  3.5 - 5.2 mmol/L Final    Chloride 06/22/2022 105  96 - 106 mmol/L Final    CO2 06/22/2022 22  20 - 29 mmol/L Final    Calcium 06/22/2022 9.5  8.6 - 10.2 mg/dL Final    Protein, total 06/22/2022 6.6  6.0 - 8.5 g/dL Final    Albumin 06/22/2022 4.2  3.7 - 4.7 g/dL Final    GLOBULIN, TOTAL 06/22/2022 2.4  1.5 - 4.5 g/dL Final    A-G Ratio 06/22/2022 1.8  1.2 - 2.2 Final    Bilirubin, total 06/22/2022 0.2  0.0 - 1.2 mg/dL Final    Alk. phosphatase 06/22/2022 98  44 - 121 IU/L Final    AST (SGOT) 06/22/2022 23  0 - 40 IU/L Final    ALT (SGPT) 06/22/2022 24  0 - 44 IU/L Final      MRI BRAIN WO CONT  Narrative: INDICATION:   pt with memory loss, holds right arm flexed with walking, eval for  structural etiology    EXAMINATION:  MRI BRAIN WO CONTRAST    COMPARISON:  3/29/21    TECHNIQUE:  Multiplanar multisequence acquisition without contrast of the brain. FINDINGS:      Ventricles:  Midline, no hydrocephalus. Brain Parenchyma/Brainstem:  Mild patchy T2 hyperintensity throughout the  supratentorial white matter. Small chronic lacunar infarctions bilateral  cerebellum. No acute infarction. Intracranial Hemorrhage:  None. Basal Cisterns:  Normal.   Flow Voids:  Normal.  Additional Comments:  N/A. Impression: Mild chronic white matter disease likely due to small vessel ischemic changes. No acute process. Current Outpatient Medications   Medication Sig Dispense Refill    amLODIPine (NORVASC) 5 mg tablet Take 1 Tablet by mouth daily. Needs appointment. 45 Tablet 0    atorvastatin (LIPITOR) 20 mg tablet Take 1.5 Tablets by mouth daily. Needs appointment.  45 Tablet 0    sertraline (ZOLOFT) 25 mg tablet Take 1 Tablet by mouth daily. Needs appointment. 90 Tablet 0    diphenhydrAMINE (Allergy) 25 mg tablet Take 25 mg by mouth every six (6) hours as needed. The past medical history, past surgical history, and family history were reviewed and updated in the medical record. Lab values/Imaging were reviewed. The medications were reviewed and updated in the medical record. Immunizations were reviewed and updated in the medical record. All relevant preventative screenings reviewed and updated in the medical record. REVIEW OF SYSTEMS   Review of Systems   Constitutional: Negative for malaise/fatigue. Eyes: Negative for blurred vision. Respiratory: Negative for cough, shortness of breath and wheezing. Cardiovascular: Negative for chest pain, palpitations, orthopnea, leg swelling and PND. Neurological: Positive for headaches. Negative for dizziness, tingling, focal weakness and weakness. Psychiatric/Behavioral: Negative for depression, memory loss and suicidal ideas. The patient is not nervous/anxious and does not have insomnia. PHYSICAL EXAM   BP (!) 154/73 (BP 1 Location: Right arm)   Pulse 89   Temp 97.8 °F (36.6 °C)   Resp 15   Ht 5' 7\" (1.702 m)   Wt 178 lb 12.8 oz (81.1 kg)   SpO2 98%   BMI 28.00 kg/m²      Physical Exam  Constitutional:       General: He is not in acute distress. Appearance: He is not ill-appearing or diaphoretic. Cardiovascular:      Rate and Rhythm: Normal rate and regular rhythm. Pulses: Normal pulses. Heart sounds: Normal heart sounds. No murmur heard. Pulmonary:      Effort: Pulmonary effort is normal. No respiratory distress. Breath sounds: Normal breath sounds. No wheezing. Abdominal:      General: Abdomen is flat. Bowel sounds are normal.      Palpations: Abdomen is soft. Skin:     General: Skin is warm. Capillary Refill: Capillary refill takes less than 2 seconds.    Neurological:      General: No focal deficit present. Mental Status: He is alert. Cranial Nerves: No cranial nerve deficit. Motor: No weakness. Psychiatric:         Mood and Affect: Mood normal.         Behavior: Behavior normal.            ASSESSMENT/ PLAN   Below is the assessment and plan developed based on review of pertinent history, physical exam, labs, studies, and medications. 1. Primary hypertension  Advised to take BP at home. If he continues to have intermittent h/a, he should take his BP and contact the clinic. Cardiac/Neuro precautions stressed. -     amLODIPine (NORVASC) 5 mg tablet; Take 1 Tablet by mouth daily. Needs appointment., Normal, Disp-45 Tablet, R-0  2. Type 2 diabetes mellitus without complication, without long-term current use of insulin (HCC)  Well controlled via diet and exercise. -     AMB POC HEMOGLOBIN A1C  3. Hyperlipidemia LDL goal <70  Will re-check Lipid Panel in 3 months. -     atorvastatin (LIPITOR) 20 mg tablet; Take 1.5 Tablets by mouth daily. Needs appointment., Normal, Disp-45 Tablet, R-0  4. Depression, unspecified depression type  -     sertraline (ZOLOFT) 25 mg tablet; Take 1 Tablet by mouth daily. Needs appointment., Normal, Disp-90 Tablet, R-0  5. Prostate cancer [facility])  Prostate has been removed. Denies any complications. 6. Overweight (BMI 25.0-29. 9)  Dietary adjustments discussed to loose weight. 7. Memory loss  Has improved since November, 2021. Will continue to monitor in the future. 8. Encounter to establish care           Follow-up and Dispositions    · Return in about 4 weeks (around 7/25/2022) for Re-assess BP w/ increase in Amlodipine . Disclaimer:  Advised patient to call back or return to office if symptoms worsen/change/persist.  Discussed expected course/resolution/complications of diagnosis in detail with patient. Medication risks/benefits/alternatives discussed with patient.   Patient was given an after visit summary which includes diagnoses, current medications, & vitals. Discussed patient instructions and advised to read to all patient instructions regarding care. Patient expressed understanding with the diagnosis and plan.        Merna Cullen NP  6/27/2022

## 2022-06-27 NOTE — PROGRESS NOTES
Chief Complaint   Patient presents with   [facility] Patient       Visit Vitals  BP (!) 154/73 (BP 1 Location: Right arm)   Pulse 89   Temp 97.8 °F (36.6 °C)   Resp 15   Ht 5' 7\" (1.702 m)   Wt 178 lb 12.8 oz (81.1 kg)   SpO2 98%   BMI 28.00 kg/m²       1. Have you been to the ER, urgent care clinic since your last visit? Hospitalized since your last visit? No    2. Have you seen or consulted any other health care providers outside of the [address] [place] [address] since your last visit? Include any pap smears or colon screening. No      3. For patients aged 39-70: Has the patient had a colonoscopy / FIT/ Cologuard?  Yes - no Care Gap present

## 2022-09-21 ENCOUNTER — TELEPHONE (OUTPATIENT)
Dept: PRIMARY CARE CLINIC | Age: 82
End: 2022-09-21

## 2022-09-21 NOTE — TELEPHONE ENCOUNTER
Patients wife wants to know why her  needs to be seen for a medication refill.  Patient asked for a call back from [facility]

## 2022-10-06 DIAGNOSIS — I10 PRIMARY HYPERTENSION: ICD-10-CM

## 2022-10-06 RX ORDER — AMLODIPINE BESYLATE 5 MG/1
5 TABLET ORAL DAILY
Qty: 45 TABLET | Refills: 0 | Status: SHIPPED | OUTPATIENT
Start: 2022-10-06 | End: 2022-10-17 | Stop reason: SDUPTHER

## 2022-10-17 ENCOUNTER — OFFICE VISIT (OUTPATIENT)
Dept: PRIMARY CARE CLINIC | Age: 82
End: 2022-10-17
Payer: COMMERCIAL

## 2022-10-17 VITALS
WEIGHT: 176 LBS | BODY MASS INDEX: 27.62 KG/M2 | OXYGEN SATURATION: 100 % | HEART RATE: 72 BPM | TEMPERATURE: 97.5 F | SYSTOLIC BLOOD PRESSURE: 138 MMHG | HEIGHT: 67 IN | RESPIRATION RATE: 16 BRPM | DIASTOLIC BLOOD PRESSURE: 61 MMHG

## 2022-10-17 DIAGNOSIS — Z23 ENCOUNTER FOR IMMUNIZATION: ICD-10-CM

## 2022-10-17 DIAGNOSIS — R00.2 INTERMITTENT PALPITATIONS: ICD-10-CM

## 2022-10-17 DIAGNOSIS — E11.9 TYPE 2 DIABETES MELLITUS WITHOUT COMPLICATION, WITHOUT LONG-TERM CURRENT USE OF INSULIN (HCC): ICD-10-CM

## 2022-10-17 DIAGNOSIS — I49.3 FREQUENT PVCS: ICD-10-CM

## 2022-10-17 DIAGNOSIS — R07.9 CHEST PAIN, UNSPECIFIED TYPE: ICD-10-CM

## 2022-10-17 DIAGNOSIS — I10 PRIMARY HYPERTENSION: Primary | ICD-10-CM

## 2022-10-17 DIAGNOSIS — E66.3 OVERWEIGHT (BMI 25.0-29.9): ICD-10-CM

## 2022-10-17 DIAGNOSIS — E78.5 DYSLIPIDEMIA (HIGH LDL; LOW HDL): ICD-10-CM

## 2022-10-17 DIAGNOSIS — F33.42 RECURRENT MAJOR DEPRESSIVE DISORDER, IN FULL REMISSION (HCC): ICD-10-CM

## 2022-10-17 LAB — HBA1C MFR BLD HPLC: 6.3 %

## 2022-10-17 PROCEDURE — 83036 HEMOGLOBIN GLYCOSYLATED A1C: CPT | Performed by: NURSE PRACTITIONER

## 2022-10-17 PROCEDURE — 1123F ACP DISCUSS/DSCN MKR DOCD: CPT | Performed by: NURSE PRACTITIONER

## 2022-10-17 PROCEDURE — 3044F HG A1C LEVEL LT 7.0%: CPT | Performed by: NURSE PRACTITIONER

## 2022-10-17 PROCEDURE — 90471 IMMUNIZATION ADMIN: CPT | Performed by: NURSE PRACTITIONER

## 2022-10-17 PROCEDURE — 93000 ELECTROCARDIOGRAM COMPLETE: CPT | Performed by: NURSE PRACTITIONER

## 2022-10-17 PROCEDURE — 99214 OFFICE O/P EST MOD 30 MIN: CPT | Performed by: NURSE PRACTITIONER

## 2022-10-17 PROCEDURE — 90694 VACC AIIV4 NO PRSRV 0.5ML IM: CPT | Performed by: NURSE PRACTITIONER

## 2022-10-17 RX ORDER — AMLODIPINE BESYLATE 5 MG/1
5 TABLET ORAL DAILY
Qty: 90 TABLET | Refills: 1 | Status: SHIPPED | OUTPATIENT
Start: 2022-10-17

## 2022-10-17 RX ORDER — SERTRALINE HYDROCHLORIDE 25 MG/1
25 TABLET, FILM COATED ORAL DAILY
Qty: 90 TABLET | Refills: 1 | Status: SHIPPED | OUTPATIENT
Start: 2022-10-17

## 2022-10-17 RX ORDER — ATORVASTATIN CALCIUM 20 MG/1
30 TABLET, FILM COATED ORAL DAILY
Qty: 45 TABLET | Refills: 0 | Status: SHIPPED | OUTPATIENT
Start: 2022-10-17

## 2022-10-17 NOTE — PROGRESS NOTES
[facility]   [address] [facility], [address] [place] [address]  P: [phone]  F: [phone]    SUBJECTIVE     HPI:     Suzy Suarez is a [de-identified] y.o. male who is seen in the clinic for   Chief Complaint   Patient presents with    Follow-up      The patient presents today for management of his co-morbidities. HTN: does not check BP at home. BP after re-check today is 138/61. Currently on Amlodipine. Dyslipidemia: Lipid Panel in  was unremarkable other than his LDL being 93. Currently on Lipitor 20 mg QHS. Depression: well managed with Zoloft 25 mg QHS. Denies any SI. DM II: HA1C today is 6.3. Not currently on medications to manage this. Diet is well rounded. Exercise is limited. BMI is 27.57. Complains of intermittent chest pain/palpitations that sometimes occur at rest. Lasts 1-2 minutes in duration and resolves without intervention. AMB POC EKG shows NSR with frequent PVC's. Would like Flu Vaccine today.      Patient Active Problem List    Diagnosis    Hyperlipidemia LDL goal <100    Prediabetes    Prostate cancer ([address]    Sebaceous cyst        Past Medical History:   Diagnosis Date    ED (erectile dysfunction)     Hypercholesterolemia     Prediabetes     Prostate cancer ([facility] [address] 2007    surgery    Sebaceous cyst 2013     Past Surgical History:   Procedure Laterality Date    COLONOSCOPY N/A 2016    COLONOSCOPY performed by Tonya Bowie MD at [facility], [facility], DIAGNOSTIC  2006    HX ORTHOPAEDIC  2015    removed cyst on back    HX PROSTATECTOMY  2007     Social History     Socioeconomic History    Marital status:      Spouse name: Not on file    Number of children: Not on file    Years of education: Not on file    Highest education level: Not on file   Occupational History    Occupation:    Tobacco Use    Smoking status: Former     Types: Cigarettes     Quit date:      Years since quittin.8    Smokeless tobacco: Never Vaping Use    Vaping Use: Never used   Substance and Sexual Activity    Alcohol use: No    Drug use: Not Currently     Comment: Quit 35 years ago    Sexual activity: Yes     Partners: Female   Other Topics Concern    Not on file   Social History Narrative    , [place] in [place] with wife. Completed 11th grade, did well, no learning disabilities.       Social Determinants of Health     Financial Resource Strain: Low Risk     Difficulty of Paying Living Expenses: Not hard at all   Food Insecurity: No Food Insecurity    Worried About Running Out of Food in the Last Year: Never true    Ran Out of Food in the Last Year: Never true   Transportation Needs: Not on file   Physical Activity: Not on file   Stress: Not on file   Social Connections: Not on file   Intimate Partner Violence: Not on file   Housing Stability: Not on file     Family History   Problem Relation Age of Onset    Breast Cancer Mother         Dec 47yo    Other Father         Head injury, Dec 47yo    Cancer Sister         esophageal    No Known Problems Sister     OSTEOARTHRITIS Daughter      Immunization History   Administered Date(s) Administered    COVID-19, PFIZER PURPLE top, DILUTE for use, (age 15 y+), IM, 30mcg/0.3mL 03/06/2021, 03/27/2021, 11/18/2021    Influenza High Dose Vaccine PF 10/11/2017, 10/19/2021    Influenza Vaccine Split 10/13/2010    Influenza, FLUAD, (age 72 y+), Adjuvanted 10/19/2020    Pneumococcal Conjugate (PCV-13) 08/18/2016    Pneumococcal Polysaccharide (PPSV-23) 08/03/2015    Tdap 08/03/2015      No Known Allergies    Office Visit on 10/17/2022   Component Date Value Ref Range Status    Hemoglobin A1c (POC) 10/17/2022 6.3  % Final      MRI BRAIN WO CONT  Narrative: INDICATION:   pt with memory loss, holds right arm flexed with walking, eval for  structural etiology    EXAMINATION:  MRI BRAIN WO CONTRAST    COMPARISON:  3/29/21    TECHNIQUE:  Multiplanar multisequence acquisition without contrast of the brain.    FINDINGS:      Ventricles:  Midline, no hydrocephalus. Brain Parenchyma/Brainstem:  Mild patchy T2 hyperintensity throughout the  supratentorial white matter. Small chronic lacunar infarctions bilateral  cerebellum. No acute infarction. Intracranial Hemorrhage:  None. Basal Cisterns:  Normal.   Flow Voids:  Normal.  Additional Comments:  N/A. Impression: Mild chronic white matter disease likely due to small vessel ischemic changes. No acute process. Current Outpatient Medications   Medication Sig Dispense Refill    amLODIPine (NORVASC) 5 mg tablet Take 1 Tablet by mouth daily. Needs appointment. 90 Tablet 1    atorvastatin (LIPITOR) 20 mg tablet Take 1.5 Tablets by mouth daily. Needs appointment. 45 Tablet 0    sertraline (ZOLOFT) 25 mg tablet Take 1 Tablet by mouth daily. 90 Tablet 1    diphenhydrAMINE (BENADRYL) 25 mg tablet Take 25 mg by mouth every six (6) hours as needed. The past medical history, past surgical history, and family history were reviewed and updated in the medical record. Lab values/Imaging were reviewed. The medications were reviewed and updated in the medical record. Immunizations were reviewed and updated in the medical record. All relevant preventative screenings reviewed and updated in the medical record. REVIEW OF SYSTEMS   Review of Systems   Constitutional:  Negative for chills, diaphoresis, fever, malaise/fatigue and weight loss. Eyes:  Negative for blurred vision and double vision. Respiratory:  Negative for cough, hemoptysis, sputum production, shortness of breath and wheezing. Cardiovascular:  Positive for chest pain and palpitations. Negative for orthopnea, claudication, leg swelling and PND. Gastrointestinal:  Negative for heartburn and nausea. Musculoskeletal:  Negative for myalgias.    Neurological:  Negative for dizziness, tingling, sensory change, speech change, focal weakness, seizures, loss of consciousness, weakness and headaches. Psychiatric/Behavioral:  Negative for depression and suicidal ideas. The patient is not nervous/anxious. PHYSICAL EXAM   /61 (BP 1 Location: Left upper arm, BP Patient Position: Sitting, BP Cuff Size: Adult)   Pulse 72   Temp 97.5 °F (36.4 °C) (Temporal)   Resp 16   Ht 5' 7\" (1.702 m)   Wt 176 lb (79.8 kg)   SpO2 100%   BMI 27.57 kg/m²      Physical Exam  Constitutional:       General: He is not in acute distress. Appearance: He is not ill-appearing or diaphoretic. Eyes:      Extraocular Movements: Extraocular movements intact. Pupils: Pupils are equal, round, and reactive to light. Cardiovascular:      Rate and Rhythm: Normal rate. Pulses: Normal pulses. Heart sounds: Normal heart sounds. No murmur heard. Comments: Frequent PVC's noted  Pulmonary:      Effort: Pulmonary effort is normal.      Breath sounds: Normal breath sounds. Abdominal:      General: Abdomen is flat. Bowel sounds are normal.      Palpations: Abdomen is soft. Neurological:      General: No focal deficit present. Mental Status: He is alert. Cranial Nerves: No cranial nerve deficit. Motor: No weakness. Psychiatric:         Mood and Affect: Mood normal.         Behavior: Behavior normal.          ASSESSMENT/ PLAN   Below is the assessment and plan developed based on review of pertinent history, physical exam, labs, studies, and medications. 1. Primary hypertension  Advised patient to take BP periodically while at home. -     METABOLIC PANEL, COMPREHENSIVE; Future  -     MICROALBUMIN, UR, RAND W/ MICROALB/CREAT RATIO; Future  -     amLODIPine (NORVASC) 5 mg tablet; Take 1 Tablet by mouth daily. Needs appointment., Normal, Disp-90 Tablet, R-1Appointment needed for future refills. 2. Type 2 diabetes mellitus without complication, without long-term current use of insulin ([address]  Continue to manage via diet/exercise.    -     AMB POC HEMOGLOBIN K9F  -     METABOLIC PANEL, COMPREHENSIVE; Future  -     MICROALBUMIN, UR, RAND W/ MICROALB/CREAT RATIO; Future  3. Dyslipidemia (high LDL; low HDL)  Pending labs, will increase Lipitor dose. Goal LDL is below 70.   -     LIPID PANEL; Future  -     atorvastatin (LIPITOR) 20 mg tablet; Take 1.5 Tablets by mouth daily. Needs appointment., Normal, Disp-45 Tablet, R-0  4. Recurrent major depressive disorder, in full remission ([facility] [address]  -     sertraline (ZOLOFT) 25 mg tablet; Take 1 Tablet by mouth daily. , Normal, Disp-90 Tablet, R-1Appointment needed for future refills. 5. Frequent PVCs  -     REFERRAL TO CARDIOLOGY  -     METABOLIC PANEL, COMPREHENSIVE; Future  -     MAGNESIUM; Future  -     THYROID CASCADE PROFILE; Future  6. Chest pain, unspecified type  -     AMB POC EKG ROUTINE W/ 12 LEADS, INTER & REP  -     REFERRAL TO CARDIOLOGY  -     METABOLIC PANEL, COMPREHENSIVE; Future  -     MAGNESIUM; Future  -     THYROID CASCADE PROFILE; Future  7. Intermittent palpitations  -     AMB POC EKG ROUTINE W/ 12 LEADS, INTER & REP  -     REFERRAL TO CARDIOLOGY  -     METABOLIC PANEL, COMPREHENSIVE; Future  -     MAGNESIUM; Future  -     THYROID CASCADE PROFILE; Future  8. Overweight (BMI 25.0-29.9)  -     AMB POC HEMOGLOBIN A1C  -     LIPID PANEL; Future  9. Encounter for immunization  -     INFLUENZA, FLUAD, (AGE 72 Y+), IM, PF, 0.5 ML     Proper Precautions discussed with the patient. Follow-up and Dispositions    Return in about 4 months (around 2/17/2023) for Management of co-morbidities. Pending labs may require a sooner follow-up. Disclaimer:  Advised patient to call back or return to office if symptoms worsen/change/persist.  Discussed expected course/resolution/complications of diagnosis in detail with patient. Medication risks/benefits/alternatives discussed with patient. Patient was given an after visit summary which includes diagnoses, current medications, & vitals.       Discussed patient instructions and advised to read to all patient instructions regarding care. Patient expressed understanding with the diagnosis and plan.        Dontae Blanchard NP  10/17/2022

## 2022-10-17 NOTE — PROGRESS NOTES
Chief Complaint   Patient presents with    Follow-up       Health Maintenance Due   Topic    Eye Exam Retinal or Dilated     Flu Vaccine (1)        1. Have you been to the ER, urgent care clinic since your last visit? Hospitalized since your last visit? No    2. Have you seen or consulted any other health care providers outside of the [address] [place] [address] since your last visit? Include any pap smears or colon screening.  No    Visit Vitals  BP (!) 161/78 (BP 1 Location: Left upper arm, BP Patient Position: Sitting, BP Cuff Size: Adult)   Pulse 72   Temp 97.5 °F (36.4 °C) (Temporal)   Resp 16   Ht 5' 7\" (1.702 m)   Wt 176 lb (79.8 kg)   SpO2 100%   BMI 27.57 kg/m²

## 2022-10-19 LAB
ALBUMIN SERPL-MCNC: 4.4 G/DL (ref 3.7–4.7)
ALBUMIN/CREAT UR: 9 MG/G CREAT (ref 0–29)
ALBUMIN/GLOB SERPL: 2 {RATIO} (ref 1.2–2.2)
ALP SERPL-CCNC: 106 IU/L (ref 44–121)
ALT SERPL-CCNC: 29 IU/L (ref 0–44)
AST SERPL-CCNC: 32 IU/L (ref 0–40)
BILIRUB SERPL-MCNC: 0.4 MG/DL (ref 0–1.2)
BUN SERPL-MCNC: 10 MG/DL (ref 8–27)
BUN/CREAT SERPL: 11 (ref 10–24)
CALCIUM SERPL-MCNC: 9.7 MG/DL (ref 8.6–10.2)
CHLORIDE SERPL-SCNC: 102 MMOL/L (ref 96–106)
CHOLEST SERPL-MCNC: 144 MG/DL (ref 100–199)
CO2 SERPL-SCNC: 18 MMOL/L (ref 20–29)
CREAT SERPL-MCNC: 0.91 MG/DL (ref 0.76–1.27)
CREAT UR-MCNC: 42.4 MG/DL
EGFR: 85 ML/MIN/1.73
GLOBULIN SER CALC-MCNC: 2.2 G/DL (ref 1.5–4.5)
GLUCOSE SERPL-MCNC: ABNORMAL MG/DL
HDLC SERPL-MCNC: 41 MG/DL
LDLC SERPL CALC-MCNC: 88 MG/DL (ref 0–99)
MAGNESIUM SERPL-MCNC: 2.1 MG/DL (ref 1.6–2.3)
MICROALBUMIN UR-MCNC: 3.9 UG/ML
POTASSIUM SERPL-SCNC: ABNORMAL MMOL/L
PROT SERPL-MCNC: 6.6 G/DL (ref 6–8.5)
SODIUM SERPL-SCNC: 146 MMOL/L (ref 134–144)
TRIGL SERPL-MCNC: 77 MG/DL (ref 0–149)
TSH SERPL DL<=0.005 MIU/L-ACNC: 1.39 UIU/ML (ref 0.45–4.5)
VLDLC SERPL CALC-MCNC: 15 MG/DL (ref 5–40)

## 2022-10-20 NOTE — PROGRESS NOTES
All labs are unremarkable. No changes to medications are necessary at this time. Have you made an appointment with Cardiology yet? Please see me in 4-5 months.

## 2023-02-02 ENCOUNTER — OFFICE VISIT (OUTPATIENT)
Dept: CARDIOLOGY CLINIC | Age: 83
End: 2023-02-02
Payer: COMMERCIAL

## 2023-02-02 VITALS
RESPIRATION RATE: 16 BRPM | DIASTOLIC BLOOD PRESSURE: 62 MMHG | WEIGHT: 169 LBS | HEART RATE: 98 BPM | BODY MASS INDEX: 26.53 KG/M2 | SYSTOLIC BLOOD PRESSURE: 144 MMHG | OXYGEN SATURATION: 98 % | HEIGHT: 67 IN

## 2023-02-02 DIAGNOSIS — I10 HYPERTENSION, ESSENTIAL: ICD-10-CM

## 2023-02-02 DIAGNOSIS — I49.3 PVC (PREMATURE VENTRICULAR CONTRACTION): Primary | ICD-10-CM

## 2023-02-02 DIAGNOSIS — R00.2 HEART PALPITATIONS: ICD-10-CM

## 2023-02-02 DIAGNOSIS — E78.00 HYPERCHOLESTEREMIA: ICD-10-CM

## 2023-02-02 PROCEDURE — 3078F DIAST BP <80 MM HG: CPT | Performed by: SPECIALIST

## 2023-02-02 PROCEDURE — 3077F SYST BP >= 140 MM HG: CPT | Performed by: SPECIALIST

## 2023-02-02 PROCEDURE — 99203 OFFICE O/P NEW LOW 30 MIN: CPT | Performed by: SPECIALIST

## 2023-02-02 NOTE — PROGRESS NOTES
Anh Ibarra     1940       Rafaela Ortiz MD, [facility]  Date of Visit-02/02/2023   PCP is REY Nunes [facility] [place]  [facility]  HPI:  Anh Ibarra is a 82 y.o. male   Patient referral from Patti Cartagena at [facility]  Patient with hypertension dyslipidemia history of depression diabetes with noted some intermittent chest pain and palpitations and EKG showed PVCs. He takes amlodipine atorvastatin and is on Zoloft. EKG on 10/17/2022 showed sinus rhythm PVCs. TSH and magnesium were normal bicarb was low at 18    Today. Shanelle Schilling He is doing well he works as a  still and does well with no problems he reports palpitations perhaps twice a month but has no associated shortness of breath or dizziness with that. Denies chest pain, edema, syncope or shortness of breath at rest  Accompanied by his wife. Assessment/Plan:     Nice gentleman who has PVCs on prior EKG. He reports symptoms of shortness of breath that are very mild he is able to exertion without difficulty. And is not a consistent thing for him is well treated on his hypertension and dyslipidemia. His palpitations are most likely due to PVCs. He has no evidence for ischemic heart disease with any chest pain or suggestion of an anginal equivalent. I think he can do an echocardiogram if this is normal then the PVCs are benign if the PVCs become more significant or if the echo is abnormal further testing will be needed. Went over things with him and his wife they are comfortable with plans. Patient Instructions   Schedule echocardiogram.      Our office will call you with results. The primary encounter diagnosis was PVC (premature ventricular contraction). Diagnoses of Heart palpitations, Hypercholesteremia, and Hypertension, essential were also pertinent to this visit. Impression:   1. PVC (premature ventricular contraction)    2.  Heart palpitations 3. Hypercholesteremia    4. Hypertension, essential       Cardiac History:   No specialty comments available. Allergies none  Medical history prostate cancer 2007 hypertension dyslipidemia  Cardiac cath no prior cardiac cath blood transfusion or ulcer. Social history quit smoking  after 25 years no alcohol drinks 3 cups of caffeine daily Works as a   has 3 children. Family history mother  of cancer at 55 father  of an accident 55 1 brother  at 37 of a heart attack 1 sister  of esophageal cancer at 56 200 or 68 in good health. Cardiovascular review of systems occasional shortness of breath fast heartbeat dizziness and leg pains with walking. These are not common  Future Appointments   Date Time Provider Jeannette Berumen   2023  2:00 PM [facility], [address] [place] [address]      Patient Care Team:  Charu Montaño NP as PCP - General (Nurse Practitioner)  Charu Montaño NP as PCP - [facility] Empaneled Provider  Mason Metz MD (Cardiovascular Disease Physician)     Review of Systems   Constitutional:  Negative for diaphoresis, fever and malaise/fatigue. HENT:  Negative for ear pain, hearing loss, nosebleeds and tinnitus. Eyes:  Positive for blurred vision and double vision. Negative for pain. Respiratory:  Positive for cough, sputum production and shortness of breath. Negative for hemoptysis, wheezing and stridor. Cardiovascular:  Positive for palpitations. Negative for chest pain, orthopnea, claudication and leg swelling. Occasional leg pain not claudication-like   Gastrointestinal:  Positive for abdominal pain and diarrhea. Negative for blood in stool, constipation, heartburn, nausea and vomiting. Genitourinary:  Positive for frequency. Negative for dysuria and urgency. Erectile dysfunction   Musculoskeletal:  Positive for back pain and joint pain. Negative for falls. Skin:  Negative for rash.    Neurological:  Positive for dizziness and tremors. Negative for seizures, loss of consciousness, weakness and headaches. Endo/Heme/Allergies:  Does not bruise/bleed easily. Psychiatric/Behavioral:  Negative for hallucinations and memory loss. The patient is nervous/anxious. The patient does not have insomnia. ROS-except as noted above. . A complete cardiac and respiratory are reviewed and negative except as above ; Resp-denies wheezing  or productive cough,. Const- No unusual weight loss or fever; Neuro-no recent seizure or CVA ; GI- No BRBPR, abdom pain, bloating ; - no  hematuria   see supplement sheet, initialed and to be scanned by staff    Past Medical History:   Diagnosis Date    ED (erectile dysfunction)     Hypercholesterolemia     Prediabetes     Prostate cancer ([facility] [address] 2007    surgery    Sebaceous cyst 2/11/2013      Social Hx= reports that he quit smoking about 45 years ago. His smoking use included cigarettes. He has never used smokeless tobacco. He reports that he does not currently use drugs. He reports that he does not drink alcohol. Family Hx- family history includes Breast Cancer in his mother; Cancer in his sister; No Known Problems in his sister; OSTEOARTHRITIS in his daughter; Other in his father. No Known Allergies     Exam and Labs:  Visit Vitals  BP (!) 144/62   Pulse 98   Resp 16   Ht 5' 7\" (1.702 m)   Wt 169 lb (76.7 kg)   SpO2 98%   BMI 26.47 kg/m²    @Constitutional:  NAD, comfortable  Head: NC,AT. Eyes: No scleral icterus. Neck:  Neck supple. No JVD present. Throat: moist mucous membranes. Chest: Effort normal & normal respiratory excursion . Neurological: alert, conversant and oriented . Skin: Skin is not cold. No obvious systemic rash noted. Not diaphoretic. No erythema. Psychiatric:  Grossly normal mood and affect. Behavior appears normal. Extremities:  no clubbing or cyanosis. Abdomen: non distended    Lungs:breath sounds normal. No stridor. distress, wheezes or  Rales.   Heart:    normal rate, regular rhythm, normal S1, S2, no murmurs, rubs, clicks or gallops , PMI non displaced. Edema: Edema is none. Lab Results   Component Value Date/Time    Cholesterol, total 144 10/17/2022 02:08 PM    HDL Cholesterol 41 10/17/2022 02:08 PM    LDL, calculated 88 10/17/2022 02:08 PM    LDL, calculated 82 10/12/2017 09:51 AM    Triglyceride 77 10/17/2022 02:08 PM     Lab Results   Component Value Date/Time    Sodium 146 (H) 10/17/2022 02:08 PM    Potassium CANCELED 10/17/2022 02:08 PM    Chloride 102 10/17/2022 02:08 PM    CO2 18 (L) 10/17/2022 02:08 PM    Glucose CANCELED 10/17/2022 02:08 PM    BUN 10 10/17/2022 02:08 PM    Creatinine 0.91 10/17/2022 02:08 PM    BUN/Creatinine ratio 11 10/17/2022 02:08 PM    GFR est AA 85 02/25/2022 12:00 AM    GFR est non-AA 73 02/25/2022 12:00 AM    Calcium 9.7 10/17/2022 02:08 PM      Wt Readings from Last 3 Encounters:   02/02/23 169 lb (76.7 kg)   10/17/22 176 lb (79.8 kg)   06/27/22 178 lb 12.8 oz (81.1 kg)      BP Readings from Last 3 Encounters:   02/02/23 (!) 144/62   10/17/22 138/61   06/27/22 (!) 154/73      Current Outpatient Medications   Medication Sig    cetirizine HCl (ALLERGY RELIEF, CETIRIZINE, PO) Take  by mouth. amLODIPine (NORVASC) 5 mg tablet Take 1 Tablet by mouth daily. Needs appointment. atorvastatin (LIPITOR) 20 mg tablet Take 1.5 Tablets by mouth daily. Needs appointment. sertraline (ZOLOFT) 25 mg tablet Take 1 Tablet by mouth daily. diphenhydrAMINE (BENADRYL) 25 mg tablet Take 25 mg by mouth every six (6) hours as needed. (Patient not taking: Reported on 2/2/2023)     No current facility-administered medications for this visit. Impression see above.

## 2023-02-02 NOTE — LETTER
2/2/2023    Patient: Paulino Luevano   YOB: 1940   Date of Visit: 2/2/2023     Eladia Orozco NP  [address] [address]  [address] [facility] [address]    Dear Eladia Orozco NP,      Thank you for referring Mr. David Stanley to [address] [place] [address] for evaluation. My notes for this consultation are attached. If you have questions, please do not hesitate to call me. I look forward to following your patient along with you.       Sincerely,    Lorrayne Opitz, MD

## 2023-02-08 DIAGNOSIS — E78.5 DYSLIPIDEMIA (HIGH LDL; LOW HDL): ICD-10-CM

## 2023-02-08 RX ORDER — ATORVASTATIN CALCIUM 10 MG/1
10 TABLET, FILM COATED ORAL
Qty: 30 TABLET | Refills: 0 | Status: SHIPPED | OUTPATIENT
Start: 2023-02-08

## 2023-02-08 RX ORDER — ATORVASTATIN CALCIUM 20 MG/1
30 TABLET, FILM COATED ORAL
Qty: 30 TABLET | Refills: 0 | Status: SHIPPED | OUTPATIENT
Start: 2023-02-08 | End: 2023-02-08 | Stop reason: SDUPTHER

## 2023-02-08 RX ORDER — ATORVASTATIN CALCIUM 20 MG/1
20 TABLET, FILM COATED ORAL
Qty: 30 TABLET | Refills: 0 | Status: SHIPPED | OUTPATIENT
Start: 2023-02-08

## 2023-02-28 ENCOUNTER — ANCILLARY PROCEDURE (OUTPATIENT)
Dept: CARDIOLOGY CLINIC | Age: 83
End: 2023-02-28

## 2023-02-28 VITALS
WEIGHT: 169 LBS | BODY MASS INDEX: 26.53 KG/M2 | DIASTOLIC BLOOD PRESSURE: 62 MMHG | SYSTOLIC BLOOD PRESSURE: 144 MMHG | HEIGHT: 67 IN

## 2023-02-28 DIAGNOSIS — E78.5 HYPERLIPIDEMIA, UNSPECIFIED HYPERLIPIDEMIA TYPE: ICD-10-CM

## 2023-02-28 DIAGNOSIS — I49.3 PVC (PREMATURE VENTRICULAR CONTRACTION): ICD-10-CM

## 2023-02-28 DIAGNOSIS — I10 HYPERTENSION, UNSPECIFIED TYPE: ICD-10-CM

## 2023-02-28 LAB
ECHO AO ASC DIAM: 3.7 CM
ECHO AO ASCENDING AORTA INDEX: 1.97 CM/M2
ECHO AO ROOT DIAM: 3.3 CM
ECHO AO ROOT INDEX: 1.76 CM/M2
ECHO AR MAX VEL PISA: 3.5 M/S
ECHO AV PEAK GRADIENT: 11 MMHG
ECHO AV PEAK VELOCITY: 1.7 M/S
ECHO AV REGURGITANT PHT: 463.4 MILLISECOND
ECHO AV VELOCITY RATIO: 0.76
ECHO EST RA PRESSURE: 3 MMHG
ECHO LA DIAMETER INDEX: 1.7 CM/M2
ECHO LA DIAMETER: 3.2 CM
ECHO LA TO AORTIC ROOT RATIO: 0.97
ECHO LA VOL 2C: 41 ML (ref 18–58)
ECHO LA VOL 4C: 43 ML (ref 18–58)
ECHO LA VOL BP: 43 ML (ref 18–58)
ECHO LA VOL/BSA BIPLANE: 23 ML/M2 (ref 16–34)
ECHO LA VOLUME AREA LENGTH: 45 ML
ECHO LA VOLUME INDEX A2C: 22 ML/M2 (ref 16–34)
ECHO LA VOLUME INDEX A4C: 23 ML/M2 (ref 16–34)
ECHO LA VOLUME INDEX AREA LENGTH: 24 ML/M2 (ref 16–34)
ECHO LV E' LATERAL VELOCITY: 5 CM/S
ECHO LV E' SEPTAL VELOCITY: 6 CM/S
ECHO LV EDV A2C: 59 ML
ECHO LV EDV A4C: 95 ML
ECHO LV EDV BP: 76 ML (ref 67–155)
ECHO LV EDV INDEX A4C: 51 ML/M2
ECHO LV EDV INDEX BP: 40 ML/M2
ECHO LV EDV NDEX A2C: 31 ML/M2
ECHO LV EJECTION FRACTION A2C: 56 %
ECHO LV EJECTION FRACTION A4C: 44 %
ECHO LV EJECTION FRACTION BIPLANE: 51 % (ref 55–100)
ECHO LV ESV A2C: 26 ML
ECHO LV ESV A4C: 54 ML
ECHO LV ESV BP: 37 ML (ref 22–58)
ECHO LV ESV INDEX A2C: 14 ML/M2
ECHO LV ESV INDEX A4C: 29 ML/M2
ECHO LV ESV INDEX BP: 20 ML/M2
ECHO LV FRACTIONAL SHORTENING: 26 % (ref 28–44)
ECHO LV INTERNAL DIMENSION DIASTOLE INDEX: 2.29 CM/M2
ECHO LV INTERNAL DIMENSION DIASTOLIC: 4.3 CM (ref 4.2–5.9)
ECHO LV INTERNAL DIMENSION SYSTOLIC INDEX: 1.7 CM/M2
ECHO LV INTERNAL DIMENSION SYSTOLIC: 3.2 CM
ECHO LV IVSD: 1 CM (ref 0.6–1)
ECHO LV MASS 2D: 142.5 G (ref 88–224)
ECHO LV MASS INDEX 2D: 75.8 G/M2 (ref 49–115)
ECHO LV POSTERIOR WALL DIASTOLIC: 1 CM (ref 0.6–1)
ECHO LV RELATIVE WALL THICKNESS RATIO: 0.47
ECHO LVOT MEAN GRADIENT: 3 MMHG
ECHO LVOT PEAK GRADIENT: 7 MMHG
ECHO LVOT PEAK VELOCITY: 1.3 M/S
ECHO LVOT VTI: 26.5 CM
ECHO MV A VELOCITY: 1.04 M/S
ECHO MV E DECELERATION TIME (DT): 256.2 MS
ECHO MV E VELOCITY: 0.52 M/S
ECHO MV E/A RATIO: 0.5
ECHO MV E/E' LATERAL: 10.4
ECHO MV E/E' RATIO (AVERAGED): 9.53
ECHO MV E/E' SEPTAL: 8.67
ECHO RA AREA 4C: 12.6 CM2
ECHO RA END SYSTOLIC VOLUME APICAL 4 CHAMBER INDEX BSA: 18 ML/M2
ECHO RA VOLUME AREA LENGTH APICAL 4 CHAMBER: 32 ML
ECHO RA VOLUME: 33 ML
ECHO RIGHT VENTRICULAR SYSTOLIC PRESSURE (RVSP): 27 MMHG
ECHO RV FREE WALL PEAK S': 12 CM/S
ECHO RV TAPSE: 2 CM (ref 1.7–?)
ECHO TV REGURGITANT MAX VELOCITY: 2.45 M/S
ECHO TV REGURGITANT PEAK GRADIENT: 24 MMHG

## 2023-03-09 ENCOUNTER — TELEPHONE (OUTPATIENT)
Dept: CARDIOLOGY CLINIC | Age: 83
End: 2023-03-09

## 2023-03-09 NOTE — TELEPHONE ENCOUNTER
Pt wife called and stated pt haven't heard anything back from echo test.. Would like to know results. . please adise    Pt # [phone]

## 2023-03-14 NOTE — TELEPHONE ENCOUNTER
Attempted to reach patient by telephone. A message was left on home and mobile. Will mail letter as well.

## 2023-03-14 NOTE — TELEPHONE ENCOUNTER
Good morning,    Patient's wife states that she would like a letter with the echo results.     Thank you,    Otilia Wilcox'

## 2023-03-15 NOTE — TELEPHONE ENCOUNTER
Two patient identifiers verified. Spoke to Mrs. Duvall and gave her results. Confirmed follow up with PCP. Also let her know letter was mailed yesterday. Mrs. Duvall verbalized understanding and appreciation.

## 2023-04-21 DIAGNOSIS — I10 ESSENTIAL (PRIMARY) HYPERTENSION: ICD-10-CM

## 2023-04-21 DIAGNOSIS — E78.5 DYSLIPIDEMIA (HIGH LDL; LOW HDL): ICD-10-CM

## 2023-04-21 DIAGNOSIS — F33.42 RECURRENT MAJOR DEPRESSIVE DISORDER, IN FULL REMISSION (HCC): ICD-10-CM

## 2023-04-21 RX ORDER — AMLODIPINE BESYLATE 2.5 MG/1
2.5 TABLET ORAL DAILY
Qty: 34 TABLET | Refills: 0 | OUTPATIENT
Start: 2023-04-21 | End: 2023-05-25

## 2023-04-21 RX ORDER — SERTRALINE HYDROCHLORIDE 25 MG/1
25 TABLET, FILM COATED ORAL DAILY
Qty: 30 TABLET | Refills: 1 | Status: SHIPPED | OUTPATIENT
Start: 2023-04-21 | End: 2023-05-25

## 2023-04-21 RX ORDER — ATORVASTATIN CALCIUM 20 MG/1
20 TABLET, FILM COATED ORAL
Qty: 30 TABLET | Refills: 1 | Status: SHIPPED | OUTPATIENT
Start: 2023-04-21

## 2023-04-21 NOTE — TELEPHONE ENCOUNTER
----- Message from Partha Navarro sent at 4/19/2023  4:06 PM EDT -----  Subject: Refill Request    QUESTIONS  Name of Medication? amLODIPine (NORVASC) 5 mg tablet  Patient-reported dosage and instructions? n/a  How many days do you have left? Unknown  Preferred Pharmacy? [facility][id number] Pharmacy phone number (if available)? [phone]  ---------------------------------------------------------------------------  --------------,  Name of Medication? sertraline (ZOLOFT) 25 mg tablet  Patient-reported dosage and instructions? n/a  How many days do you have left? 0  Preferred Pharmacy? [facility] #[id number] Pharmacy phone number (if available)? [phone]  ---------------------------------------------------------------------------  --------------,  Name of Medication? atorvastatin (LIPITOR) 20 mg tablet  Patient-reported dosage and instructions? n/a  How many days do you have left? Unknown  Preferred Pharmacy? [facility][id number] Pharmacy phone number (if available)? [phone]  Additional Information for Provider? María Jones has an appointment scheduled   for 05/24/23 for medication refills, he would them sent to [address] [facility],   spoke with wife states that he may not need a refill on all of them. He   can be reached at [phone] ok to leave a message  ---------------------------------------------------------------------------  --------------  [address]  What is the best way for the office to contact you? OK to leave message on   voicemail  Preferred Call Back Phone Number? [phone]  ---------------------------------------------------------------------------  --------------  SCRIPT ANSWERS  Relationship to Patient?  Self

## 2023-05-04 DIAGNOSIS — I10 PRIMARY HYPERTENSION: ICD-10-CM

## 2023-05-04 DIAGNOSIS — E78.5 DYSLIPIDEMIA (HIGH LDL; LOW HDL): ICD-10-CM

## 2023-05-04 DIAGNOSIS — F33.42 RECURRENT MAJOR DEPRESSIVE DISORDER, IN FULL REMISSION (HCC): ICD-10-CM

## 2023-05-04 RX ORDER — ATORVASTATIN CALCIUM 10 MG/1
10 TABLET, FILM COATED ORAL
Qty: 30 TABLET | Refills: 0 | OUTPATIENT
Start: 2023-05-04

## 2023-05-04 RX ORDER — AMLODIPINE BESYLATE 5 MG/1
5 TABLET ORAL DAILY
Qty: 30 TABLET | Refills: 0 | Status: SHIPPED | OUTPATIENT
Start: 2023-05-04

## 2023-05-04 RX ORDER — ATORVASTATIN CALCIUM 20 MG/1
20 TABLET, FILM COATED ORAL
Qty: 30 TABLET | Refills: 0 | Status: SHIPPED | OUTPATIENT
Start: 2023-05-04

## 2023-05-24 ENCOUNTER — OFFICE VISIT (OUTPATIENT)
Dept: PRIMARY CARE CLINIC | Facility: CLINIC | Age: 83
End: 2023-05-24
Payer: COMMERCIAL

## 2023-05-24 VITALS
SYSTOLIC BLOOD PRESSURE: 133 MMHG | HEART RATE: 78 BPM | RESPIRATION RATE: 16 BRPM | HEIGHT: 67 IN | OXYGEN SATURATION: 100 % | DIASTOLIC BLOOD PRESSURE: 63 MMHG | BODY MASS INDEX: 27.84 KG/M2 | TEMPERATURE: 97.8 F | WEIGHT: 177.4 LBS

## 2023-05-24 DIAGNOSIS — E11.9 ENCOUNTER FOR DIABETIC FOOT EXAM (HCC): ICD-10-CM

## 2023-05-24 DIAGNOSIS — E11.9 TYPE 2 DIABETES MELLITUS WITHOUT COMPLICATION, WITHOUT LONG-TERM CURRENT USE OF INSULIN (HCC): ICD-10-CM

## 2023-05-24 DIAGNOSIS — R35.1 NOCTURIA: ICD-10-CM

## 2023-05-24 DIAGNOSIS — I10 ESSENTIAL (PRIMARY) HYPERTENSION: ICD-10-CM

## 2023-05-24 DIAGNOSIS — E78.5 DYSLIPIDEMIA (HIGH LDL; LOW HDL): ICD-10-CM

## 2023-05-24 DIAGNOSIS — F33.42 MAJOR DEPRESSIVE DISORDER, RECURRENT, IN FULL REMISSION (HCC): Primary | ICD-10-CM

## 2023-05-24 DIAGNOSIS — J30.89 ENVIRONMENTAL AND SEASONAL ALLERGIES: ICD-10-CM

## 2023-05-24 PROCEDURE — 3075F SYST BP GE 130 - 139MM HG: CPT | Performed by: NURSE PRACTITIONER

## 2023-05-24 PROCEDURE — 1123F ACP DISCUSS/DSCN MKR DOCD: CPT | Performed by: NURSE PRACTITIONER

## 2023-05-24 PROCEDURE — 99214 OFFICE O/P EST MOD 30 MIN: CPT | Performed by: NURSE PRACTITIONER

## 2023-05-24 PROCEDURE — 3078F DIAST BP <80 MM HG: CPT | Performed by: NURSE PRACTITIONER

## 2023-05-24 RX ORDER — AMLODIPINE BESYLATE 5 MG/1
5 TABLET ORAL DAILY
Qty: 90 TABLET | Refills: 1 | Status: SHIPPED | OUTPATIENT
Start: 2023-05-24

## 2023-05-24 RX ORDER — ATORVASTATIN CALCIUM 20 MG/1
20 TABLET, FILM COATED ORAL NIGHTLY
Qty: 90 TABLET | Refills: 0 | Status: SHIPPED | OUTPATIENT
Start: 2023-05-24 | End: 2023-05-25

## 2023-05-24 RX ORDER — SERTRALINE HYDROCHLORIDE 25 MG/1
25 TABLET, FILM COATED ORAL DAILY
Qty: 90 TABLET | Refills: 1 | Status: SHIPPED | OUTPATIENT
Start: 2023-05-24

## 2023-05-24 SDOH — ECONOMIC STABILITY: FOOD INSECURITY: WITHIN THE PAST 12 MONTHS, THE FOOD YOU BOUGHT JUST DIDN'T LAST AND YOU DIDN'T HAVE MONEY TO GET MORE.: NEVER TRUE

## 2023-05-24 SDOH — ECONOMIC STABILITY: HOUSING INSECURITY
IN THE LAST 12 MONTHS, WAS THERE A TIME WHEN YOU DID NOT HAVE A STEADY PLACE TO SLEEP OR SLEPT IN A SHELTER (INCLUDING NOW)?: PATIENT REFUSED

## 2023-05-24 SDOH — ECONOMIC STABILITY: FOOD INSECURITY: WITHIN THE PAST 12 MONTHS, YOU WORRIED THAT YOUR FOOD WOULD RUN OUT BEFORE YOU GOT MONEY TO BUY MORE.: NEVER TRUE

## 2023-05-24 SDOH — ECONOMIC STABILITY: INCOME INSECURITY: HOW HARD IS IT FOR YOU TO PAY FOR THE VERY BASICS LIKE FOOD, HOUSING, MEDICAL CARE, AND HEATING?: NOT HARD AT ALL

## 2023-05-24 ASSESSMENT — ENCOUNTER SYMPTOMS
EYE DISCHARGE: 0
CHEST TIGHTNESS: 0
RHINORRHEA: 0
BACK PAIN: 0
DIARRHEA: 0
ABDOMINAL PAIN: 0
SORE THROAT: 0
SHORTNESS OF BREATH: 0
CONSTIPATION: 0
COUGH: 0
COLOR CHANGE: 0

## 2023-05-24 ASSESSMENT — PATIENT HEALTH QUESTIONNAIRE - PHQ9
SUM OF ALL RESPONSES TO PHQ QUESTIONS 1-9: 0
SUM OF ALL RESPONSES TO PHQ QUESTIONS 1-9: 0
SUM OF ALL RESPONSES TO PHQ9 QUESTIONS 1 & 2: 0
2. FEELING DOWN, DEPRESSED OR HOPELESS: 0
SUM OF ALL RESPONSES TO PHQ QUESTIONS 1-9: 0
1. LITTLE INTEREST OR PLEASURE IN DOING THINGS: 0
SUM OF ALL RESPONSES TO PHQ QUESTIONS 1-9: 0

## 2023-05-24 NOTE — PROGRESS NOTES
Identified pt with two pt identifiers(name and ). Chief Complaint   Patient presents with    Follow-up    Medication Refill        No flowsheet data found. Vitals:    23 1107   Resp: 16   Weight: 177 lb 6.4 oz (80.5 kg)   Height: 5' 7\" (1.702 m)        Health Maintenance Due   Topic Date Due    Diabetic foot exam  Never done    Diabetic retinal exam  Never done    Prostate Specific Antigen (PSA) Screening or Monitoring  2021    A1C test (Diabetic or Prediabetic)  2023        1. Have you been to the ER, urgent care clinic since your last visit? Hospitalized since your last visit? No    2. Have you seen or consulted any other health care providers outside of the [address] [place] [address] since your last visit? Include any pap smears or colon screening. No    3. For patients aged 39-70: Has the patient had a colonoscopy / FIT/ Cologuard? Yes      If the patient is female:    4. For patients aged 41-77: Has the patient had a mammogram within the past 2 years? N/A      5. For patients aged 21-65: Has the patient had a pap smear?  N/A

## 2023-05-24 NOTE — PROGRESS NOTES
[facility]   [address], [address] [facility]  P: [phone]  F: [phone]    SUBJECTIVE     HPI:     Irving Jean is a 82 y.o. male who is seen in the clinic for   Chief Complaint   Patient presents with    Follow-up    Medication Refill      The patient presents to the office today for     On , he was seen by Dr. Vasyl Spears (Cardiology) for the treatment of Palpitations/PVCs. Echo showed mild abnormal diastolic dysfunction,but was otherwise unremarkable. Hx of MDD. Currently rx'd Sertraline 25 mg QD. Hx of DM II. HA1C was 6.3 on 10/17. Managed via dietary modifications. Diet is well rounded. Exercises daily. BMI is 27. Hx of Allergies. Currently takes prn OTC Zyrtec. Hx of HTN. BP today is 133/63. Currently rx'd Amlodipine 5 mg QD. Hx of Dyslipidemia. LDL was 88 on 10/17. Currently rx'd Atorvastatin 20 mg QHS.      Patient Active Problem List   Diagnosis    Hyperlipidemia LDL goal <100    Prostate cancer ([address]    Prediabetes    Sebaceous cyst        Past Medical History:   Diagnosis Date    ED (erectile dysfunction)     Hypercholesterolemia     Prediabetes     Prostate cancer ([address] 2007    surgery    Sebaceous cyst 2013     Past Surgical History:   Procedure Laterality Date    COLONOSCOPY N/A 2016    COLONOSCOPY performed by Sandeep Shell MD at [facility] ENDOSCOPY    COLONOSCOPY  2006    ORTHOPEDIC SURGERY  2015    removed cyst on back    PROSTATECTOMY  2007     Social History     Socioeconomic History    Marital status:      Spouse name: Not on file    Number of children: Not on file    Years of education: Not on file    Highest education level: Not on file   Occupational History    Not on file   Tobacco Use    Smoking status: Former     Types: Cigarettes     Quit date: 1978     Years since quittin.4    Smokeless tobacco: Never   Vaping Use    Vaping Use: Never used   Substance and Sexual Activity    Alcohol use: No    Drug use: Not

## 2023-05-25 DIAGNOSIS — E78.5 DYSLIPIDEMIA (HIGH LDL; LOW HDL): ICD-10-CM

## 2023-05-25 LAB
ALBUMIN SERPL-MCNC: 4.2 G/DL (ref 3.6–4.6)
ALBUMIN/CREAT UR: 5 MG/G CREAT (ref 0–29)
ALBUMIN/GLOB SERPL: 1.8 {RATIO} (ref 1.2–2.2)
ALP SERPL-CCNC: 87 IU/L (ref 44–121)
ALT SERPL-CCNC: 21 IU/L (ref 0–44)
AST SERPL-CCNC: 21 IU/L (ref 0–40)
BILIRUB SERPL-MCNC: 0.4 MG/DL (ref 0–1.2)
BUN SERPL-MCNC: 12 MG/DL (ref 8–27)
BUN/CREAT SERPL: 13 (ref 10–24)
CALCIUM SERPL-MCNC: 9.3 MG/DL (ref 8.6–10.2)
CHLORIDE SERPL-SCNC: 106 MMOL/L (ref 96–106)
CHOLEST SERPL-MCNC: 197 MG/DL (ref 100–199)
CO2 SERPL-SCNC: 24 MMOL/L (ref 20–29)
CREAT SERPL-MCNC: 0.94 MG/DL (ref 0.76–1.27)
CREAT UR-MCNC: 109.9 MG/DL
EGFRCR SERPLBLD CKD-EPI 2021: 81 ML/MIN/1.73
GLOBULIN SER CALC-MCNC: 2.4 G/DL (ref 1.5–4.5)
GLUCOSE SERPL-MCNC: 96 MG/DL (ref 70–99)
HBA1C MFR BLD: 6.5 % (ref 4.8–5.6)
HDLC SERPL-MCNC: 35 MG/DL
LDLC SERPL CALC-MCNC: 142 MG/DL (ref 0–99)
MICROALBUMIN UR-MCNC: 5.9 UG/ML
POTASSIUM SERPL-SCNC: 4.5 MMOL/L (ref 3.5–5.2)
PROT SERPL-MCNC: 6.6 G/DL (ref 6–8.5)
PSA SERPL-MCNC: 0.2 NG/ML (ref 0–4)
SODIUM SERPL-SCNC: 142 MMOL/L (ref 134–144)
TRIGL SERPL-MCNC: 110 MG/DL (ref 0–149)
VLDLC SERPL CALC-MCNC: 20 MG/DL (ref 5–40)

## 2023-05-25 RX ORDER — ATORVASTATIN CALCIUM 40 MG/1
40 TABLET, FILM COATED ORAL DAILY
Qty: 30 TABLET | Refills: 0 | Status: SHIPPED | OUTPATIENT
Start: 2023-05-25

## 2023-05-25 NOTE — RESULT ENCOUNTER NOTE
Lipid Panel has worsened signficantly. Are you taking the Atorvastatin daily? I have increased your dose to 40 mg once nightly. Please limit your red meat/fatty food intake. All other labs are unremarkable. Please see me in 1-2 months to re-check your cholesterol levels.

## 2023-05-30 ENCOUNTER — TELEPHONE (OUTPATIENT)
Dept: PRIMARY CARE CLINIC | Facility: CLINIC | Age: 83
End: 2023-05-30

## 2023-05-30 NOTE — TELEPHONE ENCOUNTER
Patient's wife returned call to practice. Reviewed lab results.  Advised to schedule a follow up appointment in 2 months

## 2023-05-31 ENCOUNTER — TELEPHONE (OUTPATIENT)
Dept: PRIMARY CARE CLINIC | Facility: CLINIC | Age: 83
End: 2023-05-31

## 2023-05-31 NOTE — TELEPHONE ENCOUNTER
Patient's spouse is unsure why we prescribed atorvastatin 20 mg and then increased to 40 mg. Wanted clarification.  Patient thought their labs were normal.

## 2023-06-21 DIAGNOSIS — E78.5 DYSLIPIDEMIA (HIGH LDL; LOW HDL): ICD-10-CM

## 2023-06-21 RX ORDER — ATORVASTATIN CALCIUM 40 MG/1
TABLET, FILM COATED ORAL
Qty: 90 TABLET | Refills: 0 | Status: SHIPPED | OUTPATIENT
Start: 2023-06-21

## 2023-06-21 NOTE — TELEPHONE ENCOUNTER
PCP: MARCELINO Kumar - NP    Last Visit 5/24/2023   No future appointments.     Requested Prescriptions     Pending Prescriptions Disp Refills    atorvastatin (LIPITOR) 40 MG tablet [Pharmacy Med Name: ATORVASTATIN 40 MG TABLET] 90 tablet 0     Sig: TAKE 1 TABLET BY MOUTH EVERY DAY         Other Comments: Last Refill   05/24/23

## 2023-09-20 DIAGNOSIS — E78.5 DYSLIPIDEMIA (HIGH LDL; LOW HDL): ICD-10-CM

## 2023-09-20 RX ORDER — ATORVASTATIN CALCIUM 40 MG/1
40 TABLET, FILM COATED ORAL
Qty: 30 TABLET | Refills: 0 | Status: SHIPPED | OUTPATIENT
Start: 2023-09-20 | End: 2023-11-09 | Stop reason: SDUPTHER

## 2023-09-20 NOTE — TELEPHONE ENCOUNTER
PCP: MARCELINO Jack - NP    Last Visit 5/24/2023   No future appointments.     Requested Prescriptions     Pending Prescriptions Disp Refills    atorvastatin (LIPITOR) 40 MG tablet [Pharmacy Med Name: ATORVASTATIN 40 MG TABLET] 90 tablet 0     Sig: TAKE 1 TABLET BY MOUTH EVERY DAY         Other Comments: Last Refill   06/21/23

## 2023-11-09 DIAGNOSIS — E78.5 DYSLIPIDEMIA (HIGH LDL; LOW HDL): ICD-10-CM

## 2023-11-09 RX ORDER — ATORVASTATIN CALCIUM 40 MG/1
40 TABLET, FILM COATED ORAL
Qty: 21 TABLET | Refills: 0 | Status: SHIPPED | OUTPATIENT
Start: 2023-11-09 | End: 2023-11-28

## 2023-11-09 NOTE — TELEPHONE ENCOUNTER
Patients wife called and asked for a refill for:    Atorvastatin 40 mg tablets    Patient only has 4 pills left    Sent to  [id number] - [place], [place] - [address] -  [phone] - F [phone]

## 2023-11-09 NOTE — TELEPHONE ENCOUNTER
PCP: Leo Muniz APRN - NP    Last Visit 5/24/2023   No future appointments.    Requested Prescriptions     Pending Prescriptions Disp Refills    atorvastatin (LIPITOR) 40 MG tablet 30 tablet 0     Sig: Take 1 tablet by mouth nightly NEEDS APPOINTMENT WITH PROVIDER. LAST REFILL THAT WILL BE GRANTED.         Other Comments: Last Refill   09/20/23

## 2023-11-28 DIAGNOSIS — E78.5 DYSLIPIDEMIA (HIGH LDL; LOW HDL): ICD-10-CM

## 2023-11-28 DIAGNOSIS — I10 ESSENTIAL (PRIMARY) HYPERTENSION: ICD-10-CM

## 2023-11-28 RX ORDER — ATORVASTATIN CALCIUM 40 MG/1
40 TABLET, FILM COATED ORAL NIGHTLY
Qty: 30 TABLET | Refills: 0 | Status: SHIPPED | OUTPATIENT
Start: 2023-11-28

## 2023-11-28 RX ORDER — ATORVASTATIN CALCIUM 10 MG/1
10 TABLET, FILM COATED ORAL NIGHTLY
Qty: 30 TABLET | Refills: 0 | OUTPATIENT
Start: 2023-11-28

## 2023-11-28 RX ORDER — AMLODIPINE BESYLATE 5 MG/1
5 TABLET ORAL DAILY
Qty: 30 TABLET | Refills: 0 | Status: SHIPPED | OUTPATIENT
Start: 2023-11-28

## 2023-11-28 NOTE — TELEPHONE ENCOUNTER
PCP: MARCELINO Banegas NP    Last Visit 5/24/2023   Future Appointments   Date Time Provider [address]   12/7/2023  1:40 PM MARCELINO Banegas NP       PCP: MARCELINO Banegas NP    Last Visit 5/24/2023   Future Appointments   Date Time Provider [address]  [address]   12/7/2023  1:40 PM MARCELINO Banegas NP AMB       Requested Prescriptions     Pending Prescriptions Disp Refills    atorvastatin (LIPITOR) 40 MG tablet [Pharmacy Med Name: ATORVASTATIN 40 MG TABLET] 30 tablet      Sig: TAKE 1 TABLET BY MOUTH AT BEDTIME    atorvastatin (LIPITOR) 10 MG tablet [Pharmacy Med Name: ATORVASTATIN 10 MG TABLET] 30 tablet      Sig: TAKE 1 TABLET BY MOUTH NIGHTLY    amLODIPine (NORVASC) 5 MG tablet [Pharmacy Med Name: AMLODIPINE BESYLATE 5 MG TAB] 90 tablet 1     Sig: TAKE 1 TABLET BY MOUTH EVERY DAY         Other Comments: Last Refill   Last refill  Lipitor 40mg  Norvasc 05/24/23

## 2023-12-15 ENCOUNTER — OFFICE VISIT (OUTPATIENT)
Dept: PRIMARY CARE CLINIC | Facility: CLINIC | Age: 83
End: 2023-12-15
Payer: COMMERCIAL

## 2023-12-15 VITALS
SYSTOLIC BLOOD PRESSURE: 139 MMHG | WEIGHT: 179.2 LBS | RESPIRATION RATE: 12 BRPM | BODY MASS INDEX: 28.12 KG/M2 | HEIGHT: 67 IN | HEART RATE: 68 BPM | DIASTOLIC BLOOD PRESSURE: 68 MMHG | TEMPERATURE: 97.1 F | OXYGEN SATURATION: 98 %

## 2023-12-15 DIAGNOSIS — Z90.79 STATUS POST PROSTATECTOMY: ICD-10-CM

## 2023-12-15 DIAGNOSIS — I10 ESSENTIAL (PRIMARY) HYPERTENSION: ICD-10-CM

## 2023-12-15 DIAGNOSIS — E78.5 HYPERLIPIDEMIA LDL GOAL <70: ICD-10-CM

## 2023-12-15 DIAGNOSIS — J30.89 ENVIRONMENTAL AND SEASONAL ALLERGIES: ICD-10-CM

## 2023-12-15 DIAGNOSIS — F33.42 MAJOR DEPRESSIVE DISORDER, RECURRENT, IN FULL REMISSION (HCC): Primary | ICD-10-CM

## 2023-12-15 DIAGNOSIS — C61 MALIGNANT NEOPLASM OF PROSTATE (HCC): ICD-10-CM

## 2023-12-15 DIAGNOSIS — E11.9 TYPE 2 DIABETES MELLITUS WITHOUT COMPLICATION, WITHOUT LONG-TERM CURRENT USE OF INSULIN (HCC): ICD-10-CM

## 2023-12-15 PROCEDURE — 3078F DIAST BP <80 MM HG: CPT | Performed by: NURSE PRACTITIONER

## 2023-12-15 PROCEDURE — 99214 OFFICE O/P EST MOD 30 MIN: CPT | Performed by: NURSE PRACTITIONER

## 2023-12-15 PROCEDURE — 3075F SYST BP GE 130 - 139MM HG: CPT | Performed by: NURSE PRACTITIONER

## 2023-12-15 PROCEDURE — 3044F HG A1C LEVEL LT 7.0%: CPT | Performed by: NURSE PRACTITIONER

## 2023-12-15 PROCEDURE — 1123F ACP DISCUSS/DSCN MKR DOCD: CPT | Performed by: NURSE PRACTITIONER

## 2023-12-15 RX ORDER — AMLODIPINE BESYLATE 5 MG/1
5 TABLET ORAL DAILY
Qty: 90 TABLET | Refills: 1 | Status: SHIPPED | OUTPATIENT
Start: 2023-12-15

## 2023-12-15 RX ORDER — CETIRIZINE HYDROCHLORIDE 5 MG/1
TABLET ORAL
COMMUNITY

## 2023-12-15 RX ORDER — SERTRALINE HYDROCHLORIDE 25 MG/1
25 TABLET, FILM COATED ORAL DAILY
Qty: 90 TABLET | Refills: 1 | Status: SHIPPED | OUTPATIENT
Start: 2023-12-15

## 2023-12-15 RX ORDER — ATORVASTATIN CALCIUM 40 MG/1
40 TABLET, FILM COATED ORAL NIGHTLY
Qty: 90 TABLET | Refills: 1 | Status: SHIPPED | OUTPATIENT
Start: 2023-12-15

## 2023-12-15 ASSESSMENT — PATIENT HEALTH QUESTIONNAIRE - PHQ9
SUM OF ALL RESPONSES TO PHQ QUESTIONS 1-9: 0
1. LITTLE INTEREST OR PLEASURE IN DOING THINGS: 0
2. FEELING DOWN, DEPRESSED OR HOPELESS: 0
SUM OF ALL RESPONSES TO PHQ9 QUESTIONS 1 & 2: 0
SUM OF ALL RESPONSES TO PHQ QUESTIONS 1-9: 0

## 2023-12-16 LAB
ALBUMIN SERPL-MCNC: 4 G/DL (ref 3.7–4.7)
ALBUMIN/GLOB SERPL: 1.5 {RATIO} (ref 1.2–2.2)
ALP SERPL-CCNC: 101 IU/L (ref 44–121)
ALT SERPL-CCNC: 20 IU/L (ref 0–44)
AST SERPL-CCNC: 23 IU/L (ref 0–40)
BILIRUB SERPL-MCNC: 0.4 MG/DL (ref 0–1.2)
BUN SERPL-MCNC: 6 MG/DL (ref 8–27)
BUN/CREAT SERPL: 6 (ref 10–24)
CALCIUM SERPL-MCNC: 9.6 MG/DL (ref 8.6–10.2)
CHLORIDE SERPL-SCNC: 103 MMOL/L (ref 96–106)
CHOLEST SERPL-MCNC: 161 MG/DL (ref 100–199)
CO2 SERPL-SCNC: 24 MMOL/L (ref 20–29)
CREAT SERPL-MCNC: 1.01 MG/DL (ref 0.76–1.27)
EGFRCR SERPLBLD CKD-EPI 2021: 75 ML/MIN/1.73
GLOBULIN SER CALC-MCNC: 2.7 G/DL (ref 1.5–4.5)
GLUCOSE SERPL-MCNC: 99 MG/DL (ref 70–99)
HBA1C MFR BLD: 6.7 % (ref 4.8–5.6)
HDLC SERPL-MCNC: 32 MG/DL
LDLC SERPL CALC-MCNC: 104 MG/DL (ref 0–99)
POTASSIUM SERPL-SCNC: 4.4 MMOL/L (ref 3.5–5.2)
PROT SERPL-MCNC: 6.7 G/DL (ref 6–8.5)
SODIUM SERPL-SCNC: 141 MMOL/L (ref 134–144)
TRIGL SERPL-MCNC: 140 MG/DL (ref 0–149)
VLDLC SERPL CALC-MCNC: 25 MG/DL (ref 5–40)

## 2023-12-17 NOTE — RESULT ENCOUNTER NOTE
Lipid Panel has improved. However, not at goal. New PCP will likely need to adjust statin when you see them.    HA1C is steadily increasing. New PCP likely will need to place you on Metformin to control your diabetes.    All other labs are unremarkable.      4 = No assist / stand by assistance

## 2024-01-24 ENCOUNTER — TELEPHONE (OUTPATIENT)
Dept: PRIMARY CARE CLINIC | Facility: CLINIC | Age: 84
End: 2024-01-24

## 2024-01-24 NOTE — TELEPHONE ENCOUNTER
Patient's wife ([phone]) advised that he needs to get refills on the following medication(s):    Sertraline 25 MG  Amlodipine 5 MG  Atorvastatin 40 MG    Please send to:  [facility] #[id number]  P [phone]     Please assist with this matter.      ROSEANNT - PSR

## 2024-01-25 NOTE — TELEPHONE ENCOUNTER
Has 90 days refills plus 1 additional refill. Called wife number and it stated out of service. Called mobile number and left a message that all of these medications were sent to [facility] in December for 90 days plus 1 additional refill so please reach out to your pharmacy. Thank you.

## 2024-03-27 DIAGNOSIS — E78.5 HYPERLIPIDEMIA LDL GOAL <70: ICD-10-CM

## 2024-03-27 DIAGNOSIS — F33.42 MAJOR DEPRESSIVE DISORDER, RECURRENT, IN FULL REMISSION (HCC): ICD-10-CM

## 2024-03-27 RX ORDER — ATORVASTATIN CALCIUM 40 MG/1
40 TABLET, FILM COATED ORAL NIGHTLY
Qty: 90 TABLET | Refills: 1 | Status: CANCELLED | OUTPATIENT
Start: 2024-03-27

## 2024-03-27 RX ORDER — SERTRALINE HYDROCHLORIDE 25 MG/1
25 TABLET, FILM COATED ORAL DAILY
Qty: 90 TABLET | Refills: 1 | Status: CANCELLED | OUTPATIENT
Start: 2024-03-27

## 2024-03-27 NOTE — TELEPHONE ENCOUNTER
PCP: Leo Muniz APRN - NP    Last Visit 12/15/2023   No future appointments.    Requested Prescriptions     Pending Prescriptions Disp Refills    sertraline (ZOLOFT) 25 MG tablet 90 tablet 1     Sig: Take 1 tablet by mouth daily    atorvastatin (LIPITOR) 40 MG tablet 90 tablet 1     Sig: Take 1 tablet by mouth nightly at bedtime.         Other Comments: Last Refill   12/15/23

## 2024-04-02 DIAGNOSIS — I10 ESSENTIAL (PRIMARY) HYPERTENSION: ICD-10-CM

## 2024-04-02 DIAGNOSIS — F33.42 MAJOR DEPRESSIVE DISORDER, RECURRENT, IN FULL REMISSION (HCC): ICD-10-CM

## 2024-04-02 DIAGNOSIS — E78.5 HYPERLIPIDEMIA LDL GOAL <70: ICD-10-CM

## 2024-04-02 RX ORDER — SERTRALINE HYDROCHLORIDE 25 MG/1
25 TABLET, FILM COATED ORAL DAILY
Qty: 90 TABLET | Refills: 0 | Status: SHIPPED | OUTPATIENT
Start: 2024-04-02

## 2024-04-02 RX ORDER — AMLODIPINE BESYLATE 5 MG/1
5 TABLET ORAL DAILY
Qty: 90 TABLET | Refills: 0 | Status: SHIPPED | OUTPATIENT
Start: 2024-04-02

## 2024-04-02 RX ORDER — ATORVASTATIN CALCIUM 40 MG/1
40 TABLET, FILM COATED ORAL NIGHTLY
Qty: 90 TABLET | Refills: 0 | Status: SHIPPED | OUTPATIENT
Start: 2024-04-02

## 2024-04-02 NOTE — TELEPHONE ENCOUNTER
PCP: Leo Muniz APRN - NP    Last Visit 12/15/2023   No future appointments.    Requested Prescriptions      No prescriptions requested or ordered in this encounter         Other Comments: Last Refill 12/2023

## 2024-06-16 DIAGNOSIS — I10 ESSENTIAL (PRIMARY) HYPERTENSION: ICD-10-CM

## 2024-06-16 DIAGNOSIS — E78.5 HYPERLIPIDEMIA LDL GOAL <70: ICD-10-CM

## 2024-06-17 RX ORDER — ATORVASTATIN CALCIUM 40 MG/1
40 TABLET, FILM COATED ORAL
Qty: 90 TABLET | Refills: 0 | OUTPATIENT
Start: 2024-06-17

## 2024-06-17 RX ORDER — AMLODIPINE BESYLATE 5 MG/1
5 TABLET ORAL DAILY
Qty: 90 TABLET | Refills: 0 | OUTPATIENT
Start: 2024-06-17